# Patient Record
Sex: FEMALE | Race: WHITE | NOT HISPANIC OR LATINO | Employment: FULL TIME | URBAN - METROPOLITAN AREA
[De-identification: names, ages, dates, MRNs, and addresses within clinical notes are randomized per-mention and may not be internally consistent; named-entity substitution may affect disease eponyms.]

---

## 2023-04-21 PROBLEM — M77.9 TENDONITIS: Status: ACTIVE | Noted: 2023-04-21

## 2023-04-21 PROBLEM — K21.9 GERD (GASTROESOPHAGEAL REFLUX DISEASE): Status: ACTIVE | Noted: 2023-04-21

## 2023-04-21 PROBLEM — E78.5 HYPERLIPIDEMIA: Status: ACTIVE | Noted: 2023-04-21

## 2023-04-21 PROBLEM — T78.40XA ALLERGIES: Status: ACTIVE | Noted: 2023-04-21

## 2023-04-21 PROBLEM — I10 HYPERTENSION: Status: ACTIVE | Noted: 2023-04-21

## 2023-04-21 PROBLEM — F98.8 ATTENTION DEFICIT DISORDER (ADD) IN ADULT: Status: ACTIVE | Noted: 2023-04-21

## 2023-04-21 PROBLEM — Z87.898 HISTORY OF PREDIABETES: Status: ACTIVE | Noted: 2023-04-21

## 2023-05-27 DIAGNOSIS — K21.9 GASTROESOPHAGEAL REFLUX DISEASE WITHOUT ESOPHAGITIS: Primary | ICD-10-CM

## 2023-05-27 DIAGNOSIS — F98.8 ATTENTION DEFICIT DISORDER (ADD) IN ADULT: ICD-10-CM

## 2023-05-27 DIAGNOSIS — T78.40XA ALLERGY, INITIAL ENCOUNTER: ICD-10-CM

## 2023-05-31 RX ORDER — OMEPRAZOLE 20 MG/1
CAPSULE, DELAYED RELEASE ORAL
Qty: 90 CAPSULE | Refills: 1 | Status: SHIPPED | OUTPATIENT
Start: 2023-05-31 | End: 2023-10-11

## 2023-05-31 RX ORDER — MONTELUKAST SODIUM 10 MG/1
TABLET ORAL
Qty: 90 TABLET | Refills: 1 | Status: SHIPPED | OUTPATIENT
Start: 2023-05-31 | End: 2023-11-15 | Stop reason: SDUPTHER

## 2023-06-15 DIAGNOSIS — I10 PRIMARY HYPERTENSION: Primary | ICD-10-CM

## 2023-06-15 RX ORDER — LISINOPRIL AND HYDROCHLOROTHIAZIDE 12.5; 2 MG/1; MG/1
TABLET ORAL
Qty: 180 TABLET | Refills: 1 | Status: SHIPPED | OUTPATIENT
Start: 2023-06-15 | End: 2023-11-15 | Stop reason: SDUPTHER

## 2023-09-20 DIAGNOSIS — Z13.220 SCREENING, LIPID: ICD-10-CM

## 2023-09-20 DIAGNOSIS — Z13.0 SCREENING FOR ENDOCRINE, METABOLIC AND IMMUNITY DISORDER: Primary | ICD-10-CM

## 2023-09-20 DIAGNOSIS — Z13.29 SCREENING FOR ENDOCRINE, METABOLIC AND IMMUNITY DISORDER: Primary | ICD-10-CM

## 2023-09-20 DIAGNOSIS — Z11.59 ENCOUNTER FOR HEPATITIS C SCREENING TEST FOR LOW RISK PATIENT: ICD-10-CM

## 2023-09-20 DIAGNOSIS — E78.2 MIXED HYPERLIPIDEMIA: ICD-10-CM

## 2023-09-20 DIAGNOSIS — Z13.228 SCREENING FOR ENDOCRINE, METABOLIC AND IMMUNITY DISORDER: Primary | ICD-10-CM

## 2023-09-20 DIAGNOSIS — R39.15 URINARY URGENCY: ICD-10-CM

## 2023-09-20 RX ORDER — TOLTERODINE 4 MG/1
4 CAPSULE, EXTENDED RELEASE ORAL DAILY
Qty: 90 CAPSULE | Refills: 0 | Status: SHIPPED | OUTPATIENT
Start: 2023-09-20 | End: 2023-11-15 | Stop reason: SDUPTHER

## 2023-09-20 RX ORDER — ATORVASTATIN CALCIUM 10 MG/1
10 TABLET, FILM COATED ORAL NIGHTLY
Qty: 90 TABLET | Refills: 0 | Status: SHIPPED | OUTPATIENT
Start: 2023-09-20 | End: 2023-11-15 | Stop reason: SDUPTHER

## 2023-10-10 DIAGNOSIS — K21.9 GASTROESOPHAGEAL REFLUX DISEASE WITHOUT ESOPHAGITIS: ICD-10-CM

## 2023-10-11 RX ORDER — OMEPRAZOLE 20 MG/1
20 CAPSULE, DELAYED RELEASE ORAL DAILY
Qty: 90 CAPSULE | Refills: 1 | Status: SHIPPED | OUTPATIENT
Start: 2023-10-11 | End: 2023-11-15 | Stop reason: SDUPTHER

## 2023-10-13 ENCOUNTER — TELEPHONE (OUTPATIENT)
Dept: PHARMACY | Facility: HOSPITAL | Age: 48
End: 2023-10-13
Payer: MEDICARE

## 2023-10-13 NOTE — TELEPHONE ENCOUNTER
Population Health: Outreach by Ambulatory Pharmacy Team    Payor: Carmen HENSLEY  Reason: Adherence  Medication: Lisinopril & Hydrochlorothiazide Tab 20-12.5 MG  Outcome: Patient Reached: Claims Adherence, recently picked up fill a few days ago    Mary Andrade, PharmD  PGY-1 Pharmacy Resident  [phone]

## 2023-10-18 NOTE — TELEPHONE ENCOUNTER
I reviewed the progress note and agree with the resident’s findings and plans as written. Case discussed with resident.    Adriano Galan, PharmD

## 2023-11-15 ENCOUNTER — OFFICE VISIT (OUTPATIENT)
Dept: PRIMARY CARE | Facility: CLINIC | Age: 48
End: 2023-11-15
Payer: MEDICARE

## 2023-11-15 VITALS
SYSTOLIC BLOOD PRESSURE: 142 MMHG | HEART RATE: 87 BPM | HEIGHT: 60 IN | DIASTOLIC BLOOD PRESSURE: 82 MMHG | BODY MASS INDEX: 44.17 KG/M2 | OXYGEN SATURATION: 98 % | RESPIRATION RATE: 16 BRPM | WEIGHT: 225 LBS

## 2023-11-15 DIAGNOSIS — Z00.00 MEDICARE ANNUAL WELLNESS VISIT, SUBSEQUENT: Primary | ICD-10-CM

## 2023-11-15 DIAGNOSIS — N92.0 MENORRHAGIA WITH REGULAR CYCLE: ICD-10-CM

## 2023-11-15 DIAGNOSIS — Z12.11 ENCOUNTER FOR SCREENING FOR MALIGNANT NEOPLASM OF COLON: ICD-10-CM

## 2023-11-15 DIAGNOSIS — Z30.09 ENCOUNTER FOR COUNSELING REGARDING CONTRACEPTION: ICD-10-CM

## 2023-11-15 DIAGNOSIS — Z12.31 ENCOUNTER FOR SCREENING MAMMOGRAM FOR BREAST CANCER: ICD-10-CM

## 2023-11-15 DIAGNOSIS — T78.40XA ALLERGY, INITIAL ENCOUNTER: ICD-10-CM

## 2023-11-15 DIAGNOSIS — E66.01 OBESITY, MORBID (MULTI): ICD-10-CM

## 2023-11-15 DIAGNOSIS — E78.2 MIXED HYPERLIPIDEMIA: ICD-10-CM

## 2023-11-15 DIAGNOSIS — K21.9 GASTROESOPHAGEAL REFLUX DISEASE WITHOUT ESOPHAGITIS: ICD-10-CM

## 2023-11-15 DIAGNOSIS — R39.15 URINARY URGENCY: ICD-10-CM

## 2023-11-15 DIAGNOSIS — I10 PRIMARY HYPERTENSION: ICD-10-CM

## 2023-11-15 DIAGNOSIS — M77.9 TENDONITIS: ICD-10-CM

## 2023-11-15 PROCEDURE — 99396 PREV VISIT EST AGE 40-64: CPT | Performed by: STUDENT IN AN ORGANIZED HEALTH CARE EDUCATION/TRAINING PROGRAM

## 2023-11-15 PROCEDURE — G0439 PPPS, SUBSEQ VISIT: HCPCS | Performed by: STUDENT IN AN ORGANIZED HEALTH CARE EDUCATION/TRAINING PROGRAM

## 2023-11-15 PROCEDURE — 3079F DIAST BP 80-89 MM HG: CPT | Performed by: STUDENT IN AN ORGANIZED HEALTH CARE EDUCATION/TRAINING PROGRAM

## 2023-11-15 PROCEDURE — 99214 OFFICE O/P EST MOD 30 MIN: CPT | Performed by: STUDENT IN AN ORGANIZED HEALTH CARE EDUCATION/TRAINING PROGRAM

## 2023-11-15 PROCEDURE — 1036F TOBACCO NON-USER: CPT | Performed by: STUDENT IN AN ORGANIZED HEALTH CARE EDUCATION/TRAINING PROGRAM

## 2023-11-15 PROCEDURE — 3077F SYST BP >= 140 MM HG: CPT | Performed by: STUDENT IN AN ORGANIZED HEALTH CARE EDUCATION/TRAINING PROGRAM

## 2023-11-15 RX ORDER — OMEPRAZOLE 20 MG/1
20 CAPSULE, DELAYED RELEASE ORAL DAILY
Qty: 90 CAPSULE | Refills: 1 | Status: SHIPPED | OUTPATIENT
Start: 2023-11-15 | End: 2024-05-28

## 2023-11-15 RX ORDER — LISINOPRIL AND HYDROCHLOROTHIAZIDE 12.5; 2 MG/1; MG/1
1 TABLET ORAL 2 TIMES DAILY
Qty: 180 TABLET | Refills: 1 | Status: SHIPPED | OUTPATIENT
Start: 2023-11-15 | End: 2024-01-25 | Stop reason: DRUGHIGH

## 2023-11-15 RX ORDER — LISINOPRIL 10 MG/1
10 TABLET ORAL DAILY
Qty: 90 TABLET | Refills: 0 | Status: SHIPPED | OUTPATIENT
Start: 2023-11-15 | End: 2024-01-25 | Stop reason: SDUPTHER

## 2023-11-15 RX ORDER — ATORVASTATIN CALCIUM 10 MG/1
10 TABLET, FILM COATED ORAL NIGHTLY
Qty: 90 TABLET | Refills: 1 | Status: SHIPPED | OUTPATIENT
Start: 2023-11-15

## 2023-11-15 RX ORDER — DROSPIRENONE AND ETHINYL ESTRADIOL 0.03MG-3MG
1 KIT ORAL DAILY
Qty: 28 TABLET | Refills: 12 | Status: SHIPPED | OUTPATIENT
Start: 2023-11-15 | End: 2024-03-11

## 2023-11-15 RX ORDER — MONTELUKAST SODIUM 10 MG/1
10 TABLET ORAL DAILY
Qty: 90 TABLET | Refills: 1 | Status: SHIPPED | OUTPATIENT
Start: 2023-11-15 | End: 2024-06-03

## 2023-11-15 RX ORDER — MELOXICAM 15 MG/1
15 TABLET ORAL DAILY PRN
Qty: 90 TABLET | Refills: 1 | Status: SHIPPED | OUTPATIENT
Start: 2023-11-15 | End: 2024-05-20

## 2023-11-15 RX ORDER — TOLTERODINE 4 MG/1
4 CAPSULE, EXTENDED RELEASE ORAL DAILY
Qty: 90 CAPSULE | Refills: 1 | Status: SHIPPED | OUTPATIENT
Start: 2023-11-15 | End: 2024-06-03

## 2023-11-15 ASSESSMENT — ENCOUNTER SYMPTOMS
CONFUSION: 0
OCCASIONAL FEELINGS OF UNSTEADINESS: 0
DYSPHORIC MOOD: 0
FATIGUE: 0
ABDOMINAL PAIN: 0
TROUBLE SWALLOWING: 0
UNEXPECTED WEIGHT CHANGE: 0
FACIAL ASYMMETRY: 0
APPETITE CHANGE: 0
VOICE CHANGE: 0
PALPITATIONS: 0
DEPRESSION: 0
LOSS OF SENSATION IN FEET: 0
SPEECH DIFFICULTY: 0
ARTHRALGIAS: 0
ACTIVITY CHANGE: 0
FEVER: 0

## 2023-11-15 ASSESSMENT — ACTIVITIES OF DAILY LIVING (ADL)
DRESSING: INDEPENDENT
TAKING_MEDICATION: INDEPENDENT
BATHING: INDEPENDENT
MANAGING_FINANCES: NEEDS ASSISTANCE
DOING_HOUSEWORK: INDEPENDENT
GROCERY_SHOPPING: INDEPENDENT

## 2023-11-15 ASSESSMENT — PATIENT HEALTH QUESTIONNAIRE - PHQ9
2. FEELING DOWN, DEPRESSED OR HOPELESS: NOT AT ALL
1. LITTLE INTEREST OR PLEASURE IN DOING THINGS: NOT AT ALL
SUM OF ALL RESPONSES TO PHQ9 QUESTIONS 1 AND 2: 0

## 2023-11-15 NOTE — PROGRESS NOTES
"Subjective   Reason for Visit: Karyna Stone is an 48 y.o. female here for a Medicare Wellness visit.     Past Medical, Surgical, and Family History reviewed and updated in chart.    Reviewed all medications by prescribing practitioner or clinical pharmacist (such as prescriptions, OTCs, herbal therapies and supplements) and documented in the medical record.    HPI    49 yo female presents for medicare wellness  Insurance about to change, needs scripts printed    Doing well, about to move, no concerns     Patient Care Team:  Khushbu Contreras DO as PCP - General (Family Medicine)  Fco Grimm MD as PCP - [employer] PCP     Review of Systems   Constitutional:  Negative for activity change, appetite change, fatigue, fever and unexpected weight change.   HENT:  Negative for trouble swallowing and voice change.    Eyes:  Negative for visual disturbance.   Cardiovascular:  Negative for chest pain, palpitations and leg swelling.   Gastrointestinal:  Negative for abdominal pain.   Musculoskeletal:  Negative for arthralgias and gait problem.   Skin:  Negative for rash.   Allergic/Immunologic: Negative for immunocompromised state.   Neurological:  Negative for facial asymmetry and speech difficulty.   Psychiatric/Behavioral:  Negative for confusion and dysphoric mood.        Objective   Vitals:  /82   Pulse 87   Resp 16   Ht 1.53 m (5' 0.25\")   Wt 102 kg (225 lb)   SpO2 98%   BMI 43.58 kg/m²       Physical Exam  Constitutional:       Appearance: Normal appearance.   HENT:      Head: Normocephalic and atraumatic.   Eyes:      Extraocular Movements: Extraocular movements intact.   Cardiovascular:      Rate and Rhythm: Normal rate and regular rhythm.   Pulmonary:      Effort: Pulmonary effort is normal. No respiratory distress.   Musculoskeletal:      Right lower leg: No edema.      Left lower leg: No edema.   Skin:     Coloration: Skin is not jaundiced or pale.   Neurological:      " General: No focal deficit present.      Mental Status: She is alert and oriented to person, place, and time.   Psychiatric:         Mood and Affect: Mood normal.         Behavior: Behavior normal.         Thought Content: Thought content normal.         Judgment: Judgment normal.         Assessment/Plan   Problem List Items Addressed This Visit       Allergies    Relevant Medications    montelukast (Singulair) 10 mg tablet    GERD (gastroesophageal reflux disease)    Relevant Medications    omeprazole (PriLOSEC) 20 mg DR capsule    Hyperlipidemia    Relevant Medications    atorvastatin (Lipitor) 10 mg tablet    Hypertension (Chronic)    Current Assessment & Plan     Goal BP >130/80, suboptimal control  Continue lisinoptil hydrochlorothiazide 20-12.5, will add 10mg lisinopril   Work on maintaining a healthy weight, monitoring sodium intake, consistent activity and exercise  Avoid chronic use of NSAIDs  Do not use sudafed for decongestant when ill           Relevant Medications    lisinopriL-hydrochlorothiazide 20-12.5 mg tablet    lisinopril 10 mg tablet    Other Relevant Orders    Follow Up In [facility] - PCP - Established    Tendonitis    Relevant Medications    meloxicam (Mobic) 15 mg tablet    Medicare annual wellness visit, subsequent - Primary    Current Assessment & Plan     PNEUMONIA vaccine- Not Indicated  SHINGLES vaccine- Not Indicated  INFLUENZA vaccine- /Declined  Screening tests:  Colon cancer screening--> Ordered  Breast Cancer screening--> Ordered  Cervical Cancer Screening-->Due 2024   DXA screening--> Not Indicated  During the course of the visit the patient was educated and counseled about age appropriate screening and preventive services. Completed preventive screenings were documented in the chart and orders were placed for outstanding screenings/procedures as documented in the Assessment and Plan.  Patient Instructions (the written plan) was given to the patient at check out.            Menorrhagia with regular cycle (Chronic)    Current Assessment & Plan     Does well with Janice, periods well regulated  No breakthrough bleeding, hx of clots, able to take regularly with no missed doses          Obesity, morbid (CMS/HCC)    Current Assessment & Plan     Rides bike 3-4 times weekly.   Avoid excess fast food          Other Visit Diagnoses       Urinary urgency        Relevant Medications    tolterodine LA (Detrol LA) 4 mg 24 hr capsule    Encounter for counseling regarding contraception        Relevant Medications    drospirenone-ethinyl estradioL (Erika, Ocella) 3-0.03 mg tablet    Encounter for screening mammogram for breast cancer        Relevant Orders    BI mammo bilateral screening tomosynthesis    Encounter for screening for malignant neoplasm of colon        Relevant Orders    Cologuard® colon cancer screening

## 2023-11-15 NOTE — ASSESSMENT & PLAN NOTE
PNEUMONIA vaccine- Not Indicated  SHINGLES vaccine- Not Indicated  INFLUENZA vaccine- /Declined  Screening tests:  Colon cancer screening--> Ordered  Breast Cancer screening--> Ordered  Cervical Cancer Screening-->Due 2024   DXA screening--> Not Indicated  During the course of the visit the patient was educated and counseled about age appropriate screening and preventive services. Completed preventive screenings were documented in the chart and orders were placed for outstanding screenings/procedures as documented in the Assessment and Plan.  Patient Instructions (the written plan) was given to the patient at check out.

## 2023-11-15 NOTE — ASSESSMENT & PLAN NOTE
Goal BP >130/80, suboptimal control  Continue lisinoptil hydrochlorothiazide 20-12.5, will add 10mg lisinopril   Work on maintaining a healthy weight, monitoring sodium intake, consistent activity and exercise  Avoid chronic use of NSAIDs  Do not use sudafed for decongestant when ill

## 2023-11-15 NOTE — PATIENT INSTRUCTIONS
Adding lisinopril 10mg to lisinopril hydrochlorothiazide 20-12.5   PROVIDER:[TOKEN:[[id number]]],PROVIDER:[TOKEN:[[id number]]] Head atraumatic, normal cephalic shape.

## 2023-11-15 NOTE — ASSESSMENT & PLAN NOTE
Does well with Janice, periods well regulated  No breakthrough bleeding, hx of clots, able to take regularly with no missed doses

## 2024-01-25 ENCOUNTER — OFFICE VISIT (OUTPATIENT)
Dept: PRIMARY CARE | Facility: CLINIC | Age: 49
End: 2024-01-25
Payer: MEDICARE

## 2024-01-25 VITALS
RESPIRATION RATE: 16 BRPM | DIASTOLIC BLOOD PRESSURE: 92 MMHG | WEIGHT: 223 LBS | BODY MASS INDEX: 43.78 KG/M2 | HEIGHT: 60 IN | SYSTOLIC BLOOD PRESSURE: 140 MMHG | HEART RATE: 82 BPM | OXYGEN SATURATION: 100 %

## 2024-01-25 DIAGNOSIS — I10 PRIMARY HYPERTENSION: Primary | ICD-10-CM

## 2024-01-25 DIAGNOSIS — E66.01 OBESITY, MORBID (MULTI): ICD-10-CM

## 2024-01-25 PROCEDURE — 1036F TOBACCO NON-USER: CPT | Performed by: STUDENT IN AN ORGANIZED HEALTH CARE EDUCATION/TRAINING PROGRAM

## 2024-01-25 PROCEDURE — 99214 OFFICE O/P EST MOD 30 MIN: CPT | Performed by: STUDENT IN AN ORGANIZED HEALTH CARE EDUCATION/TRAINING PROGRAM

## 2024-01-25 PROCEDURE — 3080F DIAST BP >= 90 MM HG: CPT | Performed by: STUDENT IN AN ORGANIZED HEALTH CARE EDUCATION/TRAINING PROGRAM

## 2024-01-25 PROCEDURE — 3077F SYST BP >= 140 MM HG: CPT | Performed by: STUDENT IN AN ORGANIZED HEALTH CARE EDUCATION/TRAINING PROGRAM

## 2024-01-25 RX ORDER — LISINOPRIL 10 MG/1
10 TABLET ORAL DAILY
Qty: 90 TABLET | Refills: 0 | Status: SHIPPED | OUTPATIENT
Start: 2024-01-25 | End: 2024-07-23

## 2024-01-25 RX ORDER — LISINOPRIL AND HYDROCHLOROTHIAZIDE 12.5; 2 MG/1; MG/1
1 TABLET ORAL DAILY
Qty: 180 TABLET | Refills: 1 | Status: SHIPPED | OUTPATIENT
Start: 2024-01-25

## 2024-01-25 ASSESSMENT — ENCOUNTER SYMPTOMS
DIZZINESS: 0
OCCASIONAL FEELINGS OF UNSTEADINESS: 0
SHORTNESS OF BREATH: 0
LOSS OF SENSATION IN FEET: 0
CHEST TIGHTNESS: 0
DEPRESSION: 0
PALPITATIONS: 0
ACTIVITY CHANGE: 0
APPETITE CHANGE: 0
NERVOUS/ANXIOUS: 0
FATIGUE: 0
HEADACHES: 0

## 2024-01-25 ASSESSMENT — PATIENT HEALTH QUESTIONNAIRE - PHQ9
2. FEELING DOWN, DEPRESSED OR HOPELESS: NOT AT ALL
SUM OF ALL RESPONSES TO PHQ9 QUESTIONS 1 AND 2: 0
1. LITTLE INTEREST OR PLEASURE IN DOING THINGS: NOT AT ALL

## 2024-01-25 NOTE — ASSESSMENT & PLAN NOTE
Continue poor control, but mix up about medications  Lisinopril hydrochlorothiazide 20-12.5 PLUS lisinopril 10mg  Goal BP less than 130/80  Work on maintaining a healthy weight, monitoring sodium intake, consistent activity and exercise  Avoid chronic use of NSAIDs  Do not use sudafed for decongestant when ill

## 2024-01-25 NOTE — PROGRESS NOTES
"Patient Name:  Karyna Stone  MRN:  [id number]  :  1975    Subjective   Patient ID: Karyna Stone is a 48 y.o. female who presents for Blood Pressure Check.    HPI     47 yo female presents for BP follow up    LV added 10mg lisinopril to her usual regimen of lisinopril hydrochlorothiazide 20-12.5     /82 and 140/92    She reports some confusion and did not  the new lisinopril so has been continuing previous medication only     Review of Systems   Constitutional:  Negative for activity change, appetite change and fatigue.   Eyes:  Negative for visual disturbance.   Respiratory:  Negative for chest tightness and shortness of breath.    Cardiovascular:  Negative for chest pain, palpitations and leg swelling.   Neurological:  Negative for dizziness and headaches.   Psychiatric/Behavioral:  The patient is not nervous/anxious.      Objective   BP (!) 140/92   Pulse 82   Resp 16   Ht 1.53 m (5' 0.25\")   Wt 101 kg (223 lb)   SpO2 100%   BMI 43.19 kg/m²     Physical Exam  HENT:      Head: Normocephalic and atraumatic.   Eyes:      Extraocular Movements: Extraocular movements intact.   Cardiovascular:      Rate and Rhythm: Normal rate and regular rhythm.   Pulmonary:      Effort: Pulmonary effort is normal.   Skin:     Coloration: Skin is not jaundiced or pale.   Neurological:      General: No focal deficit present.      Mental Status: She is alert and oriented to person, place, and time.   Psychiatric:         Mood and Affect: Mood normal.         Behavior: Behavior normal.     Assessment/Plan   Problem List Items Addressed This Visit             ICD-10-CM    Hypertension - Primary (Chronic) I10     Continue poor control, but mix up about medications  Lisinopril hydrochlorothiazide 20-12.5 PLUS lisinopril 10mg  Goal BP less than 130/80  Work on maintaining a healthy weight, monitoring sodium intake, consistent activity and exercise  Avoid chronic use of NSAIDs  Do not use sudafed for decongestant " when ill           Relevant Medications    lisinopriL-hydrochlorothiazide 20-12.5 mg tablet    lisinopril 10 mg tablet    Other Relevant Orders    Follow Up In [facility] - PCP - Established    Obesity, morbid (CMS/HCC) E66.01     Rides bike and continues activity level

## 2024-03-10 DIAGNOSIS — Z30.09 ENCOUNTER FOR COUNSELING REGARDING CONTRACEPTION: ICD-10-CM

## 2024-03-11 RX ORDER — DROSPIRENONE AND ETHINYL ESTRADIOL 0.03MG-3MG
1 KIT ORAL DAILY
Qty: 84 TABLET | Refills: 3 | Status: SHIPPED | OUTPATIENT
Start: 2024-03-11

## 2024-04-24 ENCOUNTER — APPOINTMENT (OUTPATIENT)
Dept: PRIMARY CARE | Facility: CLINIC | Age: 49
End: 2024-04-24
Payer: MEDICARE

## 2024-05-18 DIAGNOSIS — M77.9 TENDONITIS: ICD-10-CM

## 2024-05-20 RX ORDER — MELOXICAM 15 MG/1
15 TABLET ORAL DAILY PRN
Qty: 90 TABLET | Refills: 1 | Status: SHIPPED | OUTPATIENT
Start: 2024-05-20

## 2024-05-27 DIAGNOSIS — K21.9 GASTROESOPHAGEAL REFLUX DISEASE WITHOUT ESOPHAGITIS: ICD-10-CM

## 2024-05-28 RX ORDER — OMEPRAZOLE 20 MG/1
CAPSULE, DELAYED RELEASE ORAL
Qty: 90 CAPSULE | Refills: 3 | Status: SHIPPED | OUTPATIENT
Start: 2024-05-28

## 2024-05-31 DIAGNOSIS — R39.15 URINARY URGENCY: ICD-10-CM

## 2024-06-02 DIAGNOSIS — T78.40XA ALLERGY, INITIAL ENCOUNTER: ICD-10-CM

## 2024-06-03 RX ORDER — TOLTERODINE 4 MG/1
CAPSULE, EXTENDED RELEASE ORAL
Qty: 90 CAPSULE | Refills: 1 | Status: SHIPPED | OUTPATIENT
Start: 2024-06-03

## 2024-06-03 RX ORDER — MONTELUKAST SODIUM 10 MG/1
10 TABLET ORAL DAILY
Qty: 90 TABLET | Refills: 1 | Status: SHIPPED | OUTPATIENT
Start: 2024-06-03

## 2024-06-17 ENCOUNTER — HOSPITAL ENCOUNTER (OUTPATIENT)
Dept: RADIOLOGY | Facility: HOSPITAL | Age: 49
Discharge: HOME | End: 2024-06-17
Payer: MEDICARE

## 2024-06-17 ENCOUNTER — TELEPHONE (OUTPATIENT)
Dept: PRIMARY CARE | Facility: CLINIC | Age: 49
End: 2024-06-17
Payer: MEDICARE

## 2024-06-17 DIAGNOSIS — Z12.31 ENCOUNTER FOR SCREENING MAMMOGRAM FOR BREAST CANCER: ICD-10-CM

## 2024-06-17 PROCEDURE — 77067 SCR MAMMO BI INCL CAD: CPT

## 2024-06-17 PROCEDURE — 77063 BREAST TOMOSYNTHESIS BI: CPT | Performed by: RADIOLOGY

## 2024-06-17 PROCEDURE — 77067 SCR MAMMO BI INCL CAD: CPT | Performed by: RADIOLOGY

## 2024-06-17 NOTE — TELEPHONE ENCOUNTER
----- Message from Khushbu Contreras DO sent at 6/17/2024  1:55 PM EDT -----  Review of mammogram, normal study no areas of concern will continue with annual screenings.

## 2024-06-22 DIAGNOSIS — E78.2 MIXED HYPERLIPIDEMIA: ICD-10-CM

## 2024-06-24 RX ORDER — ATORVASTATIN CALCIUM 10 MG/1
10 TABLET, FILM COATED ORAL NIGHTLY
Qty: 90 TABLET | Refills: 3 | Status: SHIPPED | OUTPATIENT
Start: 2024-06-24

## 2024-07-01 ENCOUNTER — APPOINTMENT (OUTPATIENT)
Dept: PRIMARY CARE | Facility: CLINIC | Age: 49
End: 2024-07-01
Payer: MEDICARE

## 2024-07-01 VITALS
SYSTOLIC BLOOD PRESSURE: 132 MMHG | DIASTOLIC BLOOD PRESSURE: 65 MMHG | OXYGEN SATURATION: 98 % | HEART RATE: 92 BPM | BODY MASS INDEX: 42.48 KG/M2 | WEIGHT: 225 LBS | HEIGHT: 61 IN

## 2024-07-01 DIAGNOSIS — I10 PRIMARY HYPERTENSION: ICD-10-CM

## 2024-07-01 DIAGNOSIS — R39.15 URINARY URGENCY: ICD-10-CM

## 2024-07-01 DIAGNOSIS — Z30.09 ENCOUNTER FOR COUNSELING REGARDING CONTRACEPTION: ICD-10-CM

## 2024-07-01 DIAGNOSIS — T78.40XA ALLERGY, INITIAL ENCOUNTER: ICD-10-CM

## 2024-07-01 DIAGNOSIS — M77.9 TENDONITIS: ICD-10-CM

## 2024-07-01 DIAGNOSIS — E78.2 MIXED HYPERLIPIDEMIA: ICD-10-CM

## 2024-07-01 DIAGNOSIS — E66.01 OBESITY, MORBID (MULTI): Primary | ICD-10-CM

## 2024-07-01 DIAGNOSIS — K21.9 GASTROESOPHAGEAL REFLUX DISEASE WITHOUT ESOPHAGITIS: ICD-10-CM

## 2024-07-01 DIAGNOSIS — N92.0 MENORRHAGIA WITH REGULAR CYCLE: Chronic | ICD-10-CM

## 2024-07-01 PROCEDURE — 1036F TOBACCO NON-USER: CPT | Performed by: STUDENT IN AN ORGANIZED HEALTH CARE EDUCATION/TRAINING PROGRAM

## 2024-07-01 PROCEDURE — 3075F SYST BP GE 130 - 139MM HG: CPT | Performed by: STUDENT IN AN ORGANIZED HEALTH CARE EDUCATION/TRAINING PROGRAM

## 2024-07-01 PROCEDURE — 99214 OFFICE O/P EST MOD 30 MIN: CPT | Performed by: STUDENT IN AN ORGANIZED HEALTH CARE EDUCATION/TRAINING PROGRAM

## 2024-07-01 PROCEDURE — 3078F DIAST BP <80 MM HG: CPT | Performed by: STUDENT IN AN ORGANIZED HEALTH CARE EDUCATION/TRAINING PROGRAM

## 2024-07-01 RX ORDER — MONTELUKAST SODIUM 10 MG/1
10 TABLET ORAL DAILY
Qty: 90 TABLET | Refills: 3 | Status: SHIPPED | OUTPATIENT
Start: 2024-07-01

## 2024-07-01 RX ORDER — MELOXICAM 15 MG/1
15 TABLET ORAL DAILY PRN
Qty: 90 TABLET | Refills: 1 | Status: SHIPPED | OUTPATIENT
Start: 2024-07-01 | End: 2024-07-01

## 2024-07-01 RX ORDER — LISINOPRIL 10 MG/1
10 TABLET ORAL DAILY
Qty: 90 TABLET | Refills: 3 | Status: CANCELLED | OUTPATIENT
Start: 2024-07-01 | End: 2025-06-26

## 2024-07-01 RX ORDER — ATORVASTATIN CALCIUM 10 MG/1
10 TABLET, FILM COATED ORAL NIGHTLY
Qty: 90 TABLET | Refills: 3 | Status: SHIPPED | OUTPATIENT
Start: 2024-07-01

## 2024-07-01 RX ORDER — DROSPIRENONE AND ETHINYL ESTRADIOL 0.03MG-3MG
1 KIT ORAL DAILY
Qty: 84 TABLET | Refills: 3 | Status: SHIPPED | OUTPATIENT
Start: 2024-07-01

## 2024-07-01 RX ORDER — LISINOPRIL AND HYDROCHLOROTHIAZIDE 12.5; 2 MG/1; MG/1
1 TABLET ORAL DAILY
Qty: 90 TABLET | Refills: 3 | Status: SHIPPED | OUTPATIENT
Start: 2024-07-01

## 2024-07-01 RX ORDER — OMEPRAZOLE 20 MG/1
CAPSULE, DELAYED RELEASE ORAL
Qty: 90 CAPSULE | Refills: 3 | Status: SHIPPED | OUTPATIENT
Start: 2024-07-01

## 2024-07-01 RX ORDER — DROSPIRENONE AND ETHINYL ESTRADIOL 0.03MG-3MG
1 KIT ORAL DAILY
Qty: 84 TABLET | Refills: 3 | Status: SHIPPED | OUTPATIENT
Start: 2024-07-01 | End: 2024-07-01

## 2024-07-01 RX ORDER — TOLTERODINE 4 MG/1
CAPSULE, EXTENDED RELEASE ORAL
Qty: 90 CAPSULE | Refills: 3 | Status: SHIPPED | OUTPATIENT
Start: 2024-07-01

## 2024-07-01 RX ORDER — MELOXICAM 15 MG/1
15 TABLET ORAL DAILY PRN
Qty: 90 TABLET | Refills: 1 | Status: SHIPPED | OUTPATIENT
Start: 2024-07-01

## 2024-07-01 ASSESSMENT — ENCOUNTER SYMPTOMS
LOSS OF SENSATION IN FEET: 0
SHORTNESS OF BREATH: 0
APPETITE CHANGE: 0
ACTIVITY CHANGE: 0
VOICE CHANGE: 0
SLEEP DISTURBANCE: 0
DYSPHORIC MOOD: 0
OCCASIONAL FEELINGS OF UNSTEADINESS: 0
TROUBLE SWALLOWING: 0
CONFUSION: 0
DEPRESSION: 0

## 2024-07-01 ASSESSMENT — COLUMBIA-SUICIDE SEVERITY RATING SCALE - C-SSRS
1. IN THE PAST MONTH, HAVE YOU WISHED YOU WERE DEAD OR WISHED YOU COULD GO TO SLEEP AND NOT WAKE UP?: NO
6. HAVE YOU EVER DONE ANYTHING, STARTED TO DO ANYTHING, OR PREPARED TO DO ANYTHING TO END YOUR LIFE?: NO
2. HAVE YOU ACTUALLY HAD ANY THOUGHTS OF KILLING YOURSELF?: NO

## 2024-07-01 ASSESSMENT — PATIENT HEALTH QUESTIONNAIRE - PHQ9
SUM OF ALL RESPONSES TO PHQ9 QUESTIONS 1 AND 2: 0
2. FEELING DOWN, DEPRESSED OR HOPELESS: NOT AT ALL
1. LITTLE INTEREST OR PLEASURE IN DOING THINGS: NOT AT ALL

## 2024-07-01 NOTE — PROGRESS NOTES
"Patient Name:  Karyna Stone  MRN:  [id number]  :  1975    Subjective   Patient ID: Karyna Stone is a 48 y.o. female who presents for Follow-up (Pt here to follow up).    HPI     49 yo female presents for follow and refills   Has not completed labs, reviewed today     Stopped 2nd lisinopril and now taking vitamins   She states BP has stayed well controlled    Review of Systems   Constitutional:  Negative for activity change and appetite change.   HENT:  Negative for trouble swallowing and voice change.    Respiratory:  Negative for shortness of breath.    Cardiovascular:  Negative for chest pain and leg swelling.   Allergic/Immunologic: Negative for immunocompromised state.   Psychiatric/Behavioral:  Negative for confusion, dysphoric mood and sleep disturbance.    Objective   /65 (BP Location: Right arm, Patient Position: Sitting)   Pulse 92   Ht 1.549 m (5' 1\")   Wt 102 kg (225 lb)   SpO2 98%   BMI 42.51 kg/m²     Physical Exam  Constitutional:       Appearance: Normal appearance.   HENT:      Head: Normocephalic.   Cardiovascular:      Rate and Rhythm: Normal rate and regular rhythm.   Pulmonary:      Effort: Pulmonary effort is normal. No respiratory distress.      Breath sounds: No wheezing.   Musculoskeletal:      Right lower leg: No edema.      Left lower leg: No edema.   Skin:     Coloration: Skin is not jaundiced or pale.   Neurological:      General: No focal deficit present.      Mental Status: She is alert and oriented to person, place, and time.   Psychiatric:         Mood and Affect: Mood normal.         Behavior: Behavior normal.     Assessment/Plan   Problem List Items Addressed This Visit             ICD-10-CM    Allergies (Chronic) T78.40XA     Avoidance of triggers  Continue montelukast, well controlled when taking   Refilled          Relevant Medications    montelukast (Singulair) 10 mg tablet    GERD (gastroesophageal reflux disease) (Chronic) K21.9     Medication and length " of use: omeprazole  Lifestyle modifications discussed with patient including:   Decreasing exacerbating foods including citric, acidic, spicy foods  Elevated head of the bed structurally or with pillows  Not eating 2 hours before laying down           Relevant Medications    omeprazole (PriLOSEC) 20 mg DR capsule    Hyperlipidemia E78.5    Relevant Medications    atorvastatin (Lipitor) 10 mg tablet    Hypertension (Chronic) I10     Goal BP less than 130/80  Continue off lisinopril 10mg, continue combination lisinopril with detrol  Work on maintaining a healthy weight, monitoring sodium intake, consistent activity and exercise  Avoid chronic use of NSAIDs  Do not use sudafed/pseudoephedrine for decongestant when ill           Relevant Medications    lisinopriL-hydrochlorothiazide 20-12.5 mg tablet    Other Relevant Orders    Follow Up In [facility] - PCP - Established    Tendonitis M77.9    Relevant Medications    meloxicam (Mobic) 15 mg tablet    Menorrhagia with regular cycle (Chronic) N92.0     Does well with Janice, periods well regulated  No breakthrough bleeding, hx of clots, able to take regularly with no missed doses             Obesity, morbid (Multi) - Primary E66.01     Bikes regularly  Work on  diet choices           Other Visit Diagnoses         Codes    Urinary urgency     R39.15    Relevant Medications    tolterodine LA (Detrol LA) 4 mg 24 hr capsule    Encounter for counseling regarding contraception     Z30.09    Relevant Medications    drospirenone-ethinyl estradioL (Erika, Ocella) 3-0.03 mg tablet

## 2024-07-01 NOTE — ASSESSMENT & PLAN NOTE
Medication and length of use: omeprazole  Lifestyle modifications discussed with patient including:   Decreasing exacerbating foods including citric, acidic, spicy foods  Elevated head of the bed structurally or with pillows  Not eating 2 hours before laying down

## 2024-07-01 NOTE — ASSESSMENT & PLAN NOTE
Goal BP less than 130/80  Continue off lisinopril 10mg, continue combination lisinopril   Work on maintaining a healthy weight, monitoring sodium intake, consistent activity and exercise  Avoid chronic use of NSAIDs  Do not use sudafed/pseudoephedrine for decongestant when ill

## 2024-08-30 ENCOUNTER — LAB (OUTPATIENT)
Dept: LAB | Facility: LAB | Age: 49
End: 2024-08-30
Payer: MEDICARE

## 2024-08-30 DIAGNOSIS — Z13.220 SCREENING, LIPID: ICD-10-CM

## 2024-08-30 DIAGNOSIS — Z11.59 ENCOUNTER FOR HEPATITIS C SCREENING TEST FOR LOW RISK PATIENT: ICD-10-CM

## 2024-08-30 DIAGNOSIS — Z13.29 SCREENING FOR ENDOCRINE, METABOLIC AND IMMUNITY DISORDER: ICD-10-CM

## 2024-08-30 DIAGNOSIS — Z13.0 SCREENING FOR ENDOCRINE, METABOLIC AND IMMUNITY DISORDER: ICD-10-CM

## 2024-08-30 DIAGNOSIS — Z13.228 SCREENING FOR ENDOCRINE, METABOLIC AND IMMUNITY DISORDER: ICD-10-CM

## 2024-08-30 LAB
ALBUMIN SERPL BCP-MCNC: 4 G/DL (ref 3.4–5)
ALP SERPL-CCNC: 57 U/L (ref 33–110)
ALT SERPL W P-5'-P-CCNC: 13 U/L (ref 7–45)
ANION GAP SERPL CALC-SCNC: 18 MMOL/L (ref 10–20)
AST SERPL W P-5'-P-CCNC: 14 U/L (ref 9–39)
BILIRUB SERPL-MCNC: 0.7 MG/DL (ref 0–1.2)
BUN SERPL-MCNC: 10 MG/DL (ref 6–23)
CALCIUM SERPL-MCNC: 8.7 MG/DL (ref 8.6–10.3)
CHLORIDE SERPL-SCNC: 98 MMOL/L (ref 98–107)
CHOLEST SERPL-MCNC: 215 MG/DL (ref 0–199)
CHOLESTEROL/HDL RATIO: 2.7
CO2 SERPL-SCNC: 21 MMOL/L (ref 21–32)
CREAT SERPL-MCNC: 0.53 MG/DL (ref 0.5–1.05)
EGFRCR SERPLBLD CKD-EPI 2021: >90 ML/MIN/1.73M*2
GLUCOSE SERPL-MCNC: 97 MG/DL (ref 74–99)
HDLC SERPL-MCNC: 78.8 MG/DL
LDLC SERPL CALC-MCNC: 117 MG/DL
NON HDL CHOLESTEROL: 136 MG/DL (ref 0–149)
POTASSIUM SERPL-SCNC: 4.1 MMOL/L (ref 3.5–5.3)
PROT SERPL-MCNC: 6.4 G/DL (ref 6.4–8.2)
SODIUM SERPL-SCNC: 133 MMOL/L (ref 136–145)
TRIGL SERPL-MCNC: 94 MG/DL (ref 0–149)
VLDL: 19 MG/DL (ref 0–40)

## 2024-08-30 PROCEDURE — 36415 COLL VENOUS BLD VENIPUNCTURE: CPT

## 2024-09-02 DIAGNOSIS — I10 PRIMARY HYPERTENSION: ICD-10-CM

## 2024-09-03 ENCOUNTER — TELEPHONE (OUTPATIENT)
Dept: PRIMARY CARE | Facility: CLINIC | Age: 49
End: 2024-09-03
Payer: MEDICARE

## 2024-09-03 RX ORDER — LISINOPRIL AND HYDROCHLOROTHIAZIDE 12.5; 2 MG/1; MG/1
1 TABLET ORAL 2 TIMES DAILY
Qty: 180 TABLET | Refills: 1 | Status: SHIPPED | OUTPATIENT
Start: 2024-09-03

## 2024-09-03 NOTE — TELEPHONE ENCOUNTER
"----- Message from Khushbu Contreras sent at 9/3/2024 12:15 PM EDT -----  Unfortunately her LDL worsened on these labs, this leads me to believe the 10mg of lipitor is not enough I would recommend we increase to 20mg, 2 tablet daily.    What else you can do to help prevent cholesterol:    -Limit foods high in saturated fat. Saturated fats come from animal products (such as cheese, fatty meats, and dairy desserts) and tropical oils (such as palm oil). Foods that are higher in saturated fat may be high in cholesterol.  -Choose foods that are low in saturated fat, trans fat, sodium (salt), and added sugars. These foods include lean meats; seafood; fat-free or low-fat milk, cheese, and yogurt; whole grains; and fruits and vegetables.  -Eat foods naturally high in fiber, such as oatmeal and beans (black, lindsay, kidney, lima, and others), and unsaturated fats, which can be found in avocados, vegetable oils like olive oil, and nuts. These foods may help prevent and manage high levels of low-density lipoprotein (LDL, or \"bad\") cholesterol and triglycerides while increasing high-density lipoprotein (HDL, or \"good\") cholesterol levels.    "

## 2024-09-03 NOTE — TELEPHONE ENCOUNTER
Pharmacist Admission Medication Reconciliation Pending Note    Prior to Admission Medications were reviewed by the pharmacist and pended for provider review during admission medication reconciliation.    Medications were pended by the pharmacist at this time as follows:    Pended Admission Order Reconciliation Actions - Heather Bryant, [facility] 3/14/2020  3:33 PM     Order Name Action Reordered As    EPINEPHrine (EPIPEN 2-GABE) 0.3 MG/0.3ML JOHN auto-injector Do Not Order for Admission     pantoprazole (PROTONIX) 40 MG tablet Do Not Order for Admission     losartan (COZAAR) 50 MG tablet Order for Admission losartan (COZAAR) tablet 100 mg    metoPROLOL succinate (TOPROL-XL) 50 MG 24 hr tablet Order for Admission metoPROLOL succinate (TOPROL-XL) ER tablet 50 mg    amLODIPine (NORVASC) 5 MG tablet Order for Admission amLODIPine (NORVASC) tablet 5 mg    clonazePAM (KLONOPIN) 1 MG tablet Order for Admission clonazePAM (KlonoPIN) tablet 1-2 mg    sildenafil (REVATIO) 20 MG tablet Do Not Order for Admission     chlorthalidone (HYGROTEN) 50 MG tablet Replace at Admission chlorthalidone (THALITONE) tablet 50 mg    potassium CHLORIDE (KLOR-CON M) 20 MEQ juan jose ER tablet Order for Admission potassium CHLORIDE (KLOR-CON M) juan jose ER tablet 20 mEq    ibuprofen (MOTRIN) 200 MG tablet Do Not Order for Admission             Orders Pended To Continue For Hospital Stay     ID Description Pended By When Reason    [id number] amLODIPine (NORVASC) tablet 5 mg-DAILY Heatherghislaine Roblero Manny, [facility] 03/14/20 1533     [id number] chlorthalidone (THALITONE) tablet 50 mg-DAILY Heather Annika Manny, [facility] 03/14/20 1533     [id number] clonazePAM (KlonoPIN) tablet 1-2 mg-DAILY PRN Heather Bryant, [facility] 03/14/20 1533     [id number] potassium CHLORIDE (KLOR-CON M) juan jose ER tablet 20 mEq-DAILY WITH BREAKFAST Heather Annikabel Bryant, [facility] 03/14/20 1533     [id number] metoPROLOL succinate (TOPROL-XL) ER tablet [facility]ILY [facility]     [id number] losartan (COZAAR)  pended   tablet 100 mg-NIGHTLY Heather Bryant, [facility] 03/14/20 1533             Pharmacist Notations:           Orders that are ultimately reconciled and signed during admission medication reconciliation may differ from the pended actions above.    Please contact the pharmacist for questions.    Heather Bryant, [facility]  3/14/2020 3:33 PM

## 2024-11-09 DIAGNOSIS — M77.9 TENDONITIS: ICD-10-CM

## 2024-11-11 RX ORDER — MELOXICAM 15 MG/1
15 TABLET ORAL DAILY PRN
Qty: 90 TABLET | Refills: 1 | Status: SHIPPED | OUTPATIENT
Start: 2024-11-11

## 2025-01-06 ENCOUNTER — APPOINTMENT (OUTPATIENT)
Dept: PRIMARY CARE | Facility: CLINIC | Age: 50
End: 2025-01-06
Payer: MEDICARE

## 2025-01-06 VITALS
SYSTOLIC BLOOD PRESSURE: 101 MMHG | BODY MASS INDEX: 46.44 KG/M2 | HEIGHT: 61 IN | DIASTOLIC BLOOD PRESSURE: 66 MMHG | HEART RATE: 92 BPM | OXYGEN SATURATION: 97 % | WEIGHT: 246 LBS

## 2025-01-06 DIAGNOSIS — Z00.00 MEDICARE ANNUAL WELLNESS VISIT, SUBSEQUENT: Primary | ICD-10-CM

## 2025-01-06 DIAGNOSIS — T78.40XA ALLERGY, INITIAL ENCOUNTER: ICD-10-CM

## 2025-01-06 DIAGNOSIS — R79.9 ABNORMAL FINDING OF BLOOD CHEMISTRY, UNSPECIFIED: ICD-10-CM

## 2025-01-06 DIAGNOSIS — E78.2 MIXED HYPERLIPIDEMIA: ICD-10-CM

## 2025-01-06 DIAGNOSIS — E55.9 VITAMIN D DEFICIENCY: ICD-10-CM

## 2025-01-06 DIAGNOSIS — I10 PRIMARY HYPERTENSION: ICD-10-CM

## 2025-01-06 DIAGNOSIS — Z12.11 ENCOUNTER FOR SCREENING FOR MALIGNANT NEOPLASM OF COLON: ICD-10-CM

## 2025-01-06 DIAGNOSIS — K21.9 GASTROESOPHAGEAL REFLUX DISEASE WITHOUT ESOPHAGITIS: ICD-10-CM

## 2025-01-06 DIAGNOSIS — Z30.09 ENCOUNTER FOR COUNSELING REGARDING CONTRACEPTION: ICD-10-CM

## 2025-01-06 DIAGNOSIS — Z87.898 HISTORY OF PREDIABETES: ICD-10-CM

## 2025-01-06 DIAGNOSIS — M77.9 TENDONITIS: ICD-10-CM

## 2025-01-06 DIAGNOSIS — R39.15 URINARY URGENCY: ICD-10-CM

## 2025-01-06 PROCEDURE — 3074F SYST BP LT 130 MM HG: CPT | Performed by: STUDENT IN AN ORGANIZED HEALTH CARE EDUCATION/TRAINING PROGRAM

## 2025-01-06 PROCEDURE — 3078F DIAST BP <80 MM HG: CPT | Performed by: STUDENT IN AN ORGANIZED HEALTH CARE EDUCATION/TRAINING PROGRAM

## 2025-01-06 PROCEDURE — 99214 OFFICE O/P EST MOD 30 MIN: CPT | Performed by: STUDENT IN AN ORGANIZED HEALTH CARE EDUCATION/TRAINING PROGRAM

## 2025-01-06 PROCEDURE — 1036F TOBACCO NON-USER: CPT | Performed by: STUDENT IN AN ORGANIZED HEALTH CARE EDUCATION/TRAINING PROGRAM

## 2025-01-06 PROCEDURE — 90715 TDAP VACCINE 7 YRS/> IM: CPT | Performed by: STUDENT IN AN ORGANIZED HEALTH CARE EDUCATION/TRAINING PROGRAM

## 2025-01-06 PROCEDURE — 90471 IMMUNIZATION ADMIN: CPT | Performed by: STUDENT IN AN ORGANIZED HEALTH CARE EDUCATION/TRAINING PROGRAM

## 2025-01-06 PROCEDURE — 3008F BODY MASS INDEX DOCD: CPT | Performed by: STUDENT IN AN ORGANIZED HEALTH CARE EDUCATION/TRAINING PROGRAM

## 2025-01-06 PROCEDURE — G0439 PPPS, SUBSEQ VISIT: HCPCS | Performed by: STUDENT IN AN ORGANIZED HEALTH CARE EDUCATION/TRAINING PROGRAM

## 2025-01-06 RX ORDER — OMEPRAZOLE 20 MG/1
CAPSULE, DELAYED RELEASE ORAL
Qty: 90 CAPSULE | Refills: 3 | Status: SHIPPED | OUTPATIENT
Start: 2025-01-06

## 2025-01-06 RX ORDER — DROSPIRENONE AND ETHINYL ESTRADIOL 0.03MG-3MG
1 KIT ORAL DAILY
Qty: 84 TABLET | Refills: 3 | Status: SHIPPED | OUTPATIENT
Start: 2025-01-06

## 2025-01-06 RX ORDER — MONTELUKAST SODIUM 10 MG/1
10 TABLET ORAL DAILY
Qty: 90 TABLET | Refills: 3 | Status: SHIPPED | OUTPATIENT
Start: 2025-01-06

## 2025-01-06 RX ORDER — ATORVASTATIN CALCIUM 10 MG/1
10 TABLET, FILM COATED ORAL NIGHTLY
Qty: 90 TABLET | Refills: 3 | Status: SHIPPED | OUTPATIENT
Start: 2025-01-06

## 2025-01-06 RX ORDER — LISINOPRIL 10 MG/1
10 TABLET ORAL DAILY
Qty: 90 TABLET | Refills: 0 | Status: SHIPPED | OUTPATIENT
Start: 2025-01-06 | End: 2025-07-05

## 2025-01-06 RX ORDER — MELOXICAM 15 MG/1
15 TABLET ORAL DAILY PRN
Qty: 90 TABLET | Refills: 1 | Status: SHIPPED | OUTPATIENT
Start: 2025-01-06

## 2025-01-06 RX ORDER — LISINOPRIL AND HYDROCHLOROTHIAZIDE 12.5; 2 MG/1; MG/1
1 TABLET ORAL 2 TIMES DAILY
Qty: 180 TABLET | Refills: 1 | Status: SHIPPED | OUTPATIENT
Start: 2025-01-06

## 2025-01-06 RX ORDER — TOLTERODINE 4 MG/1
CAPSULE, EXTENDED RELEASE ORAL
Qty: 90 CAPSULE | Refills: 3 | Status: SHIPPED | OUTPATIENT
Start: 2025-01-06

## 2025-01-06 ASSESSMENT — PATIENT HEALTH QUESTIONNAIRE - PHQ9
SUM OF ALL RESPONSES TO PHQ9 QUESTIONS 1 AND 2: 0
2. FEELING DOWN, DEPRESSED OR HOPELESS: NOT AT ALL
2. FEELING DOWN, DEPRESSED OR HOPELESS: NOT AT ALL
1. LITTLE INTEREST OR PLEASURE IN DOING THINGS: NOT AT ALL
1. LITTLE INTEREST OR PLEASURE IN DOING THINGS: NOT AT ALL
SUM OF ALL RESPONSES TO PHQ9 QUESTIONS 1 AND 2: 0

## 2025-01-06 ASSESSMENT — ENCOUNTER SYMPTOMS
HEADACHES: 0
SHORTNESS OF BREATH: 0
BACK PAIN: 0
OCCASIONAL FEELINGS OF UNSTEADINESS: 0
DEPRESSION: 0
LIGHT-HEADEDNESS: 0
FEVER: 0
FATIGUE: 0
LOSS OF SENSATION IN FEET: 0
ARTHRALGIAS: 0
WHEEZING: 0
CONFUSION: 0

## 2025-01-06 ASSESSMENT — ACTIVITIES OF DAILY LIVING (ADL)
TAKING_MEDICATION: INDEPENDENT
MANAGING_FINANCES: TOTAL CARE
BATHING: INDEPENDENT
DOING_HOUSEWORK: INDEPENDENT
DRESSING: INDEPENDENT
GROCERY_SHOPPING: INDEPENDENT

## 2025-01-06 ASSESSMENT — COLUMBIA-SUICIDE SEVERITY RATING SCALE - C-SSRS
1. IN THE PAST MONTH, HAVE YOU WISHED YOU WERE DEAD OR WISHED YOU COULD GO TO SLEEP AND NOT WAKE UP?: NO
2. HAVE YOU ACTUALLY HAD ANY THOUGHTS OF KILLING YOURSELF?: NO
6. HAVE YOU EVER DONE ANYTHING, STARTED TO DO ANYTHING, OR PREPARED TO DO ANYTHING TO END YOUR LIFE?: NO

## 2025-01-06 NOTE — ASSESSMENT & PLAN NOTE
Tdap-Ordered  Flu-Declines     Colon cancer screening- cologuard ordered  Mammogram - Due June 2025  Recommend cervical cancer screening     During the course of the visit the patient was educated and counseled about age appropriate screening and preventive services. Completed preventive screenings were documented in the chart and orders were placed for outstanding screenings/procedures as documented in the Assessment and Plan.    Patient Instructions (the written plan) was given to the patient at check out that include any community based lifestyle interventions.    Other risk factors and conditions for which interventions are recommended are addressed as the other tagged diagnoses in this encounter.

## 2025-01-06 NOTE — ASSESSMENT & PLAN NOTE
Orders:    omeprazole (PriLOSEC) 20 mg DR capsule; TAKE 1 CAPSULE BY MOUTH EVERY DAY BEFORE BREAKFAST

## 2025-01-06 NOTE — PROGRESS NOTES
Subjective   Reason for Visit: Karyna Stone is an 49 y.o. female here for a Medicare Wellness visit.     Past Medical, Surgical, and Family History reviewed and updated in chart.    Reviewed all medications by prescribing practitioner or clinical pharmacist (such as prescriptions, OTCs, herbal therapies and supplements) and documented in the medical record.    HPI    50 yo female presents for follow up and medicare wellness  No concerns today but needs refills, prefers print    Patient Care Team:  Khushbu Contreras DO as PCP - General (Family Medicine)  Khushbu Contreras DO as PCP - [employer] PCP     Past Medical History:   Diagnosis Date    Obesity, unspecified 04/12/2022    Class 2 obesity with body mass index (BMI) of 39.0 to 39.9 in adult    Pain in left leg 04/12/2022    Pain of left lower extremity    Pain in right arm 04/18/2021    Pain of right upper extremity    Personal history of other diseases of the musculoskeletal system and connective tissue 10/12/2021    History of tendinitis     Past Surgical History:   Procedure Laterality Date    OTHER SURGICAL HISTORY  11/18/2019    Tonsillectomy with adenoidectomy    OTHER SURGICAL HISTORY  11/18/2019    Cholecystectomy     Family History   Problem Relation Name Age of Onset    Lung cancer Father's Brother      Lung cancer Maternal Grandfather      Cancer Paternal Grandmother         Body mass index is 46.48 kg/m².    Tobacco/Alcohol/Opioid use, as well as Illicit Drug Use was screened for/reviewed and documented in Social History section and medication list as appropriate    Medications and Supplements  prescribed by me and other practitioners or clinical pharmacist (such as prescriptions, OTC's, herbal therapies and supplements) were reviewed and documented in the medical record.    Tobacco/Alcohol/Opioid use, as well as Illicit Drug Use was screened for/reviewed and documented in Social History section and medication list as  "appropriate    Review of Systems   Constitutional:  Negative for fatigue and fever.   Respiratory:  Negative for shortness of breath and wheezing.    Cardiovascular:  Negative for chest pain and leg swelling.   Musculoskeletal:  Negative for arthralgias and back pain.   Allergic/Immunologic: Negative for immunocompromised state.   Neurological:  Negative for light-headedness and headaches.   Psychiatric/Behavioral:  Negative for confusion.      Objective   Vitals:  /66   Pulse 92   Ht 1.549 m (5' 1\")   Wt 112 kg (246 lb)   SpO2 97%   BMI 46.48 kg/m²       Physical Exam  Constitutional:       Appearance: Normal appearance.   HENT:      Head: Normocephalic and atraumatic.   Cardiovascular:      Rate and Rhythm: Normal rate and regular rhythm.   Pulmonary:      Effort: Pulmonary effort is normal.   Musculoskeletal:      Right lower leg: No edema.      Left lower leg: No edema.   Skin:     Coloration: Skin is not jaundiced.   Neurological:      Mental Status: She is alert and oriented to person, place, and time.   Psychiatric:         Mood and Affect: Mood normal.         Behavior: Behavior normal.       Assessment & Plan  Medicare annual wellness visit, subsequent  Tdap-Ordered  Flu-Declines     Colon cancer screening- cologuard ordered  Mammogram - Due June 2025  Recommend cervical cancer screening     During the course of the visit the patient was educated and counseled about age appropriate screening and preventive services. Completed preventive screenings were documented in the chart and orders were placed for outstanding screenings/procedures as documented in the Assessment and Plan.    Patient Instructions (the written plan) was given to the patient at check out that include any community based lifestyle interventions.    Other risk factors and conditions for which interventions are recommended are addressed as the other tagged diagnoses in this encounter.          Primary hypertension  Goal BP less " than 130/80  Continue lisinopril + lisinopril hydrochlorothiazide   Work on maintaining a healthy weight, monitoring sodium intake, consistent activity and exercise  Avoid chronic use of NSAIDs  Do not use sudafed/pseudoephedrine for decongestant when ill    Orders:    Follow Up In [facility] - PCP - Established    lisinopril 10 mg tablet; Take 1 tablet (10 mg) by mouth once daily.    lisinopriL-hydrochlorothiazide 20-12.5 mg tablet; Take 1 tablet by mouth 2 times a day.    Follow Up In [facility] - PCP - Established; Future    Mixed hyperlipidemia  Orders:    atorvastatin (Lipitor) 10 mg tablet; Take 1 tablet (10 mg) by mouth once daily at bedtime.    Lipid Panel; Future    Encounter for counseling regarding contraception  Takes as prescribed without gaps or breaks  Review of safe sex practices  Orders:    drospirenone-ethinyl estradioL (Erika, Ocella) 3-0.03 mg tablet; Take 1 tablet by mouth once daily. as directed    Tendonitis  Orders:    meloxicam (Mobic) 15 mg tablet; Take 1 tablet (15 mg) by mouth once daily as needed for moderate pain (4 - 6).    Allergy, initial encounter  Orders:    montelukast (Singulair) 10 mg tablet; Take 1 tablet (10 mg) by mouth once daily.    Gastroesophageal reflux disease without esophagitis  Orders:    omeprazole (PriLOSEC) 20 mg DR capsule; TAKE 1 CAPSULE BY MOUTH EVERY DAY BEFORE BREAKFAST    Urinary urgency  Orders:    tolterodine LA (Detrol LA) 4 mg 24 hr capsule; TAKE 1 CAPSULE BY MOUTH ONCE DAILY. DO NOT CRUSH,CHEW, OR SPIT.    History of prediabetes  Orders:    Comprehensive Metabolic Panel; Future    CBC and Auto Differential; Future    Hemoglobin A1c; Future    Vitamin D deficiency  Orders:    Vitamin D 25-Hydroxy,Total (for eval of Vitamin D levels); Future    Abnormal finding of blood chemistry, unspecified  Orders:    Hemoglobin A1c; Future    Encounter for screening for malignant neoplasm of colon  Orders:    Cologuard® colon cancer screening;  Future    Cologuard® colon cancer screening

## 2025-01-06 NOTE — ASSESSMENT & PLAN NOTE
Orders:    atorvastatin (Lipitor) 10 mg tablet; Take 1 tablet (10 mg) by mouth once daily at bedtime.    Lipid Panel; Future

## 2025-01-06 NOTE — ASSESSMENT & PLAN NOTE
Goal BP less than 130/80  Continue lisinopril + lisinopril hydrochlorothiazide   Work on maintaining a healthy weight, monitoring sodium intake, consistent activity and exercise  Avoid chronic use of NSAIDs  Do not use sudafed/pseudoephedrine for decongestant when ill    Orders:    Follow Up In [facility] - PCP - Established    lisinopril 10 mg tablet; Take 1 tablet (10 mg) by mouth once daily.    lisinopriL-hydrochlorothiazide 20-12.5 mg tablet; Take 1 tablet by mouth 2 times a day.    Follow Up In [facility] - PCP - Established; Future

## 2025-01-06 NOTE — ASSESSMENT & PLAN NOTE
Orders:    Comprehensive Metabolic Panel; Future    CBC and Auto Differential; Future    Hemoglobin A1c; Future

## 2025-01-06 NOTE — ASSESSMENT & PLAN NOTE
Orders:    meloxicam (Mobic) 15 mg tablet; Take 1 tablet (15 mg) by mouth once daily as needed for moderate pain (4 - 6).

## 2025-02-05 ENCOUNTER — TELEPHONE (OUTPATIENT)
Dept: PRIMARY CARE | Facility: CLINIC | Age: 50
End: 2025-02-05
Payer: MEDICARE

## 2025-02-05 LAB — NONINV COLON CA DNA+OCC BLD SCRN STL QL: NEGATIVE

## 2025-02-05 NOTE — TELEPHONE ENCOUNTER
----- Message from Khushbu Contreras sent at 2/5/2025  7:11 AM EST -----  Normal cologuard, which is good. Plan to recheck in 3 years.

## 2025-02-09 DIAGNOSIS — Z30.09 ENCOUNTER FOR COUNSELING REGARDING CONTRACEPTION: ICD-10-CM

## 2025-02-10 RX ORDER — DROSPIRENONE AND ETHINYL ESTRADIOL 0.03MG-3MG
1 KIT ORAL DAILY
Qty: 84 TABLET | Refills: 3 | Status: SHIPPED | OUTPATIENT
Start: 2025-02-10

## 2025-03-11 ENCOUNTER — TELEPHONE (OUTPATIENT)
Dept: PRIMARY CARE | Facility: CLINIC | Age: 50
End: 2025-03-11
Payer: MEDICARE

## 2025-03-11 ENCOUNTER — HOSPITAL ENCOUNTER (EMERGENCY)
Facility: HOSPITAL | Age: 50
Discharge: HOME | End: 2025-03-11
Attending: STUDENT IN AN ORGANIZED HEALTH CARE EDUCATION/TRAINING PROGRAM
Payer: MEDICARE

## 2025-03-11 VITALS
DIASTOLIC BLOOD PRESSURE: 70 MMHG | HEART RATE: 88 BPM | TEMPERATURE: 97.5 F | OXYGEN SATURATION: 98 % | RESPIRATION RATE: 12 BRPM | SYSTOLIC BLOOD PRESSURE: 141 MMHG | BODY MASS INDEX: 43.43 KG/M2 | HEIGHT: 61 IN | WEIGHT: 230 LBS

## 2025-03-11 DIAGNOSIS — I10 HYPERTENSION, UNSPECIFIED TYPE: Primary | Chronic | ICD-10-CM

## 2025-03-11 DIAGNOSIS — T78.3XXA ANGIOEDEMA, INITIAL ENCOUNTER: Primary | ICD-10-CM

## 2025-03-11 LAB
ABO GROUP (TYPE) IN BLOOD: NORMAL
ABO GROUP (TYPE) IN BLOOD: NORMAL
ANTIBODY SCREEN: NORMAL
BLOOD EXPIRATION DATE: NORMAL
DISPENSE STATUS: NORMAL
PRODUCT BLOOD TYPE: 8400
PRODUCT CODE: NORMAL
RH FACTOR (ANTIGEN D): NORMAL
RH FACTOR (ANTIGEN D): NORMAL
UNIT ABO: NORMAL
UNIT NUMBER: NORMAL
UNIT RH: NORMAL
UNIT VOLUME: 325
UNIT VOLUME: 325
UNIT VOLUME: 327
UNIT VOLUME: 327

## 2025-03-11 PROCEDURE — 36430 TRANSFUSION BLD/BLD COMPNT: CPT

## 2025-03-11 PROCEDURE — 99284 EMERGENCY DEPT VISIT MOD MDM: CPT | Mod: 25 | Performed by: STUDENT IN AN ORGANIZED HEALTH CARE EDUCATION/TRAINING PROGRAM

## 2025-03-11 PROCEDURE — 96372 THER/PROPH/DIAG INJ SC/IM: CPT | Performed by: STUDENT IN AN ORGANIZED HEALTH CARE EDUCATION/TRAINING PROGRAM

## 2025-03-11 PROCEDURE — 36415 COLL VENOUS BLD VENIPUNCTURE: CPT | Performed by: STUDENT IN AN ORGANIZED HEALTH CARE EDUCATION/TRAINING PROGRAM

## 2025-03-11 PROCEDURE — 2500000004 HC RX 250 GENERAL PHARMACY W/ HCPCS (ALT 636 FOR OP/ED): Performed by: STUDENT IN AN ORGANIZED HEALTH CARE EDUCATION/TRAINING PROGRAM

## 2025-03-11 PROCEDURE — 96365 THER/PROPH/DIAG IV INF INIT: CPT | Mod: 59

## 2025-03-11 PROCEDURE — P9017 PLASMA 1 DONOR FRZ W/IN 8 HR: HCPCS

## 2025-03-11 PROCEDURE — 86900 BLOOD TYPING SEROLOGIC ABO: CPT | Performed by: STUDENT IN AN ORGANIZED HEALTH CARE EDUCATION/TRAINING PROGRAM

## 2025-03-11 PROCEDURE — 96375 TX/PRO/DX INJ NEW DRUG ADDON: CPT

## 2025-03-11 RX ORDER — TRANEXAMIC ACID 10 MG/ML
1000 INJECTION, SOLUTION INTRAVENOUS ONCE
Status: COMPLETED | OUTPATIENT
Start: 2025-03-11 | End: 2025-03-11

## 2025-03-11 RX ORDER — PREDNISONE 50 MG/1
50 TABLET ORAL DAILY
Qty: 5 TABLET | Refills: 0 | Status: SHIPPED | OUTPATIENT
Start: 2025-03-11 | End: 2025-03-16

## 2025-03-11 RX ORDER — DIPHENHYDRAMINE HYDROCHLORIDE 50 MG/ML
50 INJECTION INTRAMUSCULAR; INTRAVENOUS ONCE
Status: COMPLETED | OUTPATIENT
Start: 2025-03-11 | End: 2025-03-11

## 2025-03-11 RX ORDER — FAMOTIDINE 20 MG/1
20 TABLET, FILM COATED ORAL 2 TIMES DAILY
Qty: 30 TABLET | Refills: 0 | Status: SHIPPED | OUTPATIENT
Start: 2025-03-11 | End: 2025-03-26

## 2025-03-11 RX ORDER — EPINEPHRINE 1 MG/ML
0.5 INJECTION INTRAMUSCULAR; INTRAVENOUS; SUBCUTANEOUS ONCE
Status: COMPLETED | OUTPATIENT
Start: 2025-03-11 | End: 2025-03-11

## 2025-03-11 RX ORDER — CETIRIZINE HYDROCHLORIDE 10 MG/1
10 TABLET ORAL EVERY 12 HOURS
Qty: 30 TABLET | Refills: 0 | Status: SHIPPED | OUTPATIENT
Start: 2025-03-11 | End: 2025-03-26

## 2025-03-11 RX ORDER — LOSARTAN POTASSIUM 25 MG/1
25 TABLET ORAL DAILY
Qty: 30 TABLET | Refills: 11 | Status: SHIPPED | OUTPATIENT
Start: 2025-03-11 | End: 2025-03-21 | Stop reason: SDUPTHER

## 2025-03-11 RX ORDER — FAMOTIDINE 10 MG/ML
20 INJECTION, SOLUTION INTRAVENOUS ONCE
Status: COMPLETED | OUTPATIENT
Start: 2025-03-11 | End: 2025-03-11

## 2025-03-11 RX ADMIN — METHYLPREDNISOLONE SODIUM SUCCINATE 125 MG: 125 INJECTION, POWDER, FOR SOLUTION INTRAMUSCULAR; INTRAVENOUS at 02:41

## 2025-03-11 RX ADMIN — DIPHENHYDRAMINE HYDROCHLORIDE 50 MG: 50 INJECTION, SOLUTION INTRAMUSCULAR; INTRAVENOUS at 02:41

## 2025-03-11 RX ADMIN — TRANEXAMIC ACID 1000 MG: 10 INJECTION, SOLUTION INTRAVENOUS at 02:41

## 2025-03-11 RX ADMIN — FAMOTIDINE 20 MG: 10 INJECTION, SOLUTION INTRAVENOUS at 02:41

## 2025-03-11 RX ADMIN — EPINEPHRINE 0.5 MG: 1 INJECTION INTRAMUSCULAR; INTRAVENOUS; SUBCUTANEOUS at 02:42

## 2025-03-11 ASSESSMENT — PAIN - FUNCTIONAL ASSESSMENT: PAIN_FUNCTIONAL_ASSESSMENT: 0-10

## 2025-03-11 ASSESSMENT — PAIN SCALES - GENERAL: PAINLEVEL_OUTOF10: 0 - NO PAIN

## 2025-03-11 NOTE — ED PROVIDER NOTES
HPI   Chief Complaint   Patient presents with    Oral Swelling     PT had minor tongue swelling @ 2130 woke up at 0000 with worse. PT takes lisinopril.        49-year-old female with past ministry of hypertension presents ED with concerns for swelling to the face.  Patient says she had some swelling to her right jaw before going to bed but she woke up with her lips and tongue swollen.  Denies rash or itching.  No chest pain or shortness of breath.  She is on lisinopril.  Not have any pain.              Patient History   Past Medical History:   Diagnosis Date    Obesity, unspecified 04/12/2022    Class 2 obesity with body mass index (BMI) of 39.0 to 39.9 in adult    Pain in left leg 04/12/2022    Pain of left lower extremity    Pain in right arm 04/18/2021    Pain of right upper extremity    Personal history of other diseases of the musculoskeletal system and connective tissue 10/12/2021    History of tendinitis     Past Surgical History:   Procedure Laterality Date    OTHER SURGICAL HISTORY  11/18/2019    Tonsillectomy with adenoidectomy    OTHER SURGICAL HISTORY  11/18/2019    Cholecystectomy     Family History   Problem Relation Name Age of Onset    Lung cancer Father's Brother      Lung cancer Maternal Grandfather      Cancer Paternal Grandmother       Social History     Tobacco Use    Smoking status: Former     Types: Cigarettes    Smokeless tobacco: Former   Substance Use Topics    Alcohol use: Yes     Comment: rarely    Drug use: Not on file       Physical Exam   ED Triage Vitals [03/11/25 0219]   Temperature Heart Rate Respirations BP   36.1 °C (97 °F) (!) 120 18 153/85      Pulse Ox Temp src Heart Rate Source Patient Position   95 % -- -- --      BP Location FiO2 (%)     -- --       Physical Exam  Vitals and nursing note reviewed.   Constitutional:       General: She is not in acute distress.     Appearance: She is well-developed.   HENT:      Head: Normocephalic and atraumatic.      Comments: There is lip  and tongue swelling.  Normal voice.  Handling secretions.  No erythema or tenderness or signs of infection.  Eyes:      Conjunctiva/sclera: Conjunctivae normal.   Cardiovascular:      Rate and Rhythm: Normal rate and regular rhythm.      Heart sounds: No murmur heard.  Pulmonary:      Effort: Pulmonary effort is normal. No respiratory distress.      Breath sounds: Normal breath sounds.   Abdominal:      Palpations: Abdomen is soft.      Tenderness: There is no abdominal tenderness.   Musculoskeletal:         General: No swelling.      Cervical back: Neck supple.   Skin:     General: Skin is warm and dry.      Capillary Refill: Capillary refill takes less than 2 seconds.   Neurological:      Mental Status: She is alert.   Psychiatric:         Mood and Affect: Mood normal.           ED Course & MDM   Diagnoses as of 03/11/25 0639   Angioedema, initial encounter                 No data recorded     Yomaira Coma Scale Score: 15 (03/11/25 0220 : Segun Harrison RN)                           Medical Decision Making  HISTORIAN:  Patient    CHART REVIEW:  No pertinent findings    PT SUMMARY:  49-year-old female presents to ED with concerns for oral swelling.  Upon arrival she is tachycardic otherwise stable.    DDX:  Angioedema, anaphylaxis, deep space neck infection    PLAN:  Will treat patient's symptoms with epinephrine, Pepcid, Benadryl, steroids, TXA and FFP    DISPO/RE-EVAL:  On reevaluation patient's symptoms are much improved.  I did offer patient admission for further airway monitoring however she declined.  Therefore, will discharge patient home with prescriptions for steroids and antihistamines.  I also advised that she should discontinue lisinopril immediately.  I added this to her allergy list.  Recommend following with primary care.  Advised to come back to the ED for any new or worsening symptoms.          Procedure  Procedures     Oswald Méndez,   03/11/25 0643

## 2025-03-11 NOTE — TELEPHONE ENCOUNTER
Patient was seen in the ED she had an allergic reaction to her lisinopril. She will need to start a new medication.

## 2025-03-11 NOTE — PROGRESS NOTES
Patient called to report went to ED last night due to angioedema side effect from lisinopril. Was taken off the lisinopril in the ED but not restarted on another BP medication. Patients PCP out on leave, will order losartan for patient to start and have patient follow up with a [place] BP check in 1 weeks.

## 2025-03-11 NOTE — TELEPHONE ENCOUNTER
Step mom called because patient rode her bike to ER at 1:30am having an allergic reaction to her Lisinopril. She is worried about patient not having any BP medication. Patient was swollen and her throat was swollen shut. Can someone please advise?

## 2025-03-21 ENCOUNTER — TELEPHONE (OUTPATIENT)
Dept: PRIMARY CARE | Facility: CLINIC | Age: 50
End: 2025-03-21

## 2025-03-21 ENCOUNTER — APPOINTMENT (OUTPATIENT)
Dept: PRIMARY CARE | Facility: CLINIC | Age: 50
End: 2025-03-21
Payer: MEDICARE

## 2025-03-21 VITALS — SYSTOLIC BLOOD PRESSURE: 170 MMHG | HEART RATE: 82 BPM | DIASTOLIC BLOOD PRESSURE: 90 MMHG | OXYGEN SATURATION: 99 %

## 2025-03-21 DIAGNOSIS — I10 PRIMARY HYPERTENSION: Primary | ICD-10-CM

## 2025-03-21 DIAGNOSIS — I10 HYPERTENSION, UNSPECIFIED TYPE: Chronic | ICD-10-CM

## 2025-03-21 RX ORDER — LOSARTAN POTASSIUM 50 MG/1
50 TABLET ORAL DAILY
Qty: 30 TABLET | Refills: 1 | Status: SHIPPED | OUTPATIENT
Start: 2025-03-21 | End: 2025-05-20

## 2025-03-21 RX ORDER — HYDROCHLOROTHIAZIDE 12.5 MG/1
12.5 TABLET ORAL DAILY
Qty: 30 TABLET | Refills: 1 | Status: SHIPPED | OUTPATIENT
Start: 2025-03-21 | End: 2025-05-20

## 2025-03-21 NOTE — PROGRESS NOTES
Subjective   Patient ID: Karyna Stone is a 49 y.o. female who presents for Nurse Visit (Blood Pressure Recheck).    HPI     Review of Systems    Objective   /90 (BP Location: Right arm, Patient Position: Sitting)   Pulse 82   SpO2 99%     Physical Exam    Assessment/Plan

## 2025-03-21 NOTE — TELEPHONE ENCOUNTER
Please send script for losartan 50mg take 1 PO every day   Pharmacy [facility] in [place]    [phone] please call Tawnya

## 2025-04-21 ENCOUNTER — APPOINTMENT (OUTPATIENT)
Dept: PRIMARY CARE | Facility: CLINIC | Age: 50
End: 2025-04-21
Payer: MEDICARE

## 2025-04-21 VITALS
OXYGEN SATURATION: 99 % | HEART RATE: 88 BPM | WEIGHT: 231 LBS | DIASTOLIC BLOOD PRESSURE: 78 MMHG | BODY MASS INDEX: 43.61 KG/M2 | HEIGHT: 61 IN | SYSTOLIC BLOOD PRESSURE: 140 MMHG

## 2025-04-21 DIAGNOSIS — I10 PRIMARY HYPERTENSION: Primary | ICD-10-CM

## 2025-04-21 DIAGNOSIS — E78.2 MIXED HYPERLIPIDEMIA: ICD-10-CM

## 2025-04-21 DIAGNOSIS — R73.03 PREDIABETES: ICD-10-CM

## 2025-04-21 DIAGNOSIS — E66.01 OBESITY, MORBID (MULTI): ICD-10-CM

## 2025-04-21 DIAGNOSIS — I10 HYPERTENSION, UNSPECIFIED TYPE: Chronic | ICD-10-CM

## 2025-04-21 PROBLEM — F89 DEVELOPMENTAL DISABILITY: Status: ACTIVE | Noted: 2025-04-21

## 2025-04-21 PROBLEM — M54.12 CERVICAL RADICULOPATHY: Status: ACTIVE | Noted: 2025-04-21

## 2025-04-21 PROBLEM — R01.1 HEART MURMUR: Status: ACTIVE | Noted: 2025-04-21

## 2025-04-21 PROBLEM — E11.9 TYPE 2 DIABETES MELLITUS WITHOUT COMPLICATION: Status: ACTIVE | Noted: 2025-04-21

## 2025-04-21 PROCEDURE — 3008F BODY MASS INDEX DOCD: CPT

## 2025-04-21 PROCEDURE — 3077F SYST BP >= 140 MM HG: CPT

## 2025-04-21 PROCEDURE — 4010F ACE/ARB THERAPY RXD/TAKEN: CPT

## 2025-04-21 PROCEDURE — 3078F DIAST BP <80 MM HG: CPT

## 2025-04-21 PROCEDURE — 1036F TOBACCO NON-USER: CPT

## 2025-04-21 PROCEDURE — 99214 OFFICE O/P EST MOD 30 MIN: CPT

## 2025-04-21 RX ORDER — LOSARTAN POTASSIUM 50 MG/1
50 TABLET ORAL DAILY
Qty: 90 TABLET | Refills: 1 | Status: SHIPPED | OUTPATIENT
Start: 2025-04-21

## 2025-04-21 RX ORDER — HYDROCHLOROTHIAZIDE 12.5 MG/1
12.5 TABLET ORAL DAILY
Qty: 90 TABLET | Refills: 1 | Status: SHIPPED | OUTPATIENT
Start: 2025-04-21

## 2025-04-21 ASSESSMENT — ENCOUNTER SYMPTOMS
OCCASIONAL FEELINGS OF UNSTEADINESS: 0
LOSS OF SENSATION IN FEET: 0
CONSTIPATION: 0
SHORTNESS OF BREATH: 0
DIARRHEA: 0
COUGH: 0
DEPRESSION: 0

## 2025-04-21 ASSESSMENT — COLUMBIA-SUICIDE SEVERITY RATING SCALE - C-SSRS
6. HAVE YOU EVER DONE ANYTHING, STARTED TO DO ANYTHING, OR PREPARED TO DO ANYTHING TO END YOUR LIFE?: NO
1. IN THE PAST MONTH, HAVE YOU WISHED YOU WERE DEAD OR WISHED YOU COULD GO TO SLEEP AND NOT WAKE UP?: NO
2. HAVE YOU ACTUALLY HAD ANY THOUGHTS OF KILLING YOURSELF?: NO

## 2025-04-21 NOTE — ASSESSMENT & PLAN NOTE
Lab Results   Component Value Date    HGBA1C 5.8 (A) 04/09/2022    HGBA1C 5.8 04/09/2021    HGBA1C 5.8 10/30/2020     - Recheck A1c prior to next visit, ordered

## 2025-04-21 NOTE — ASSESSMENT & PLAN NOTE
- Continue to do Simin, discussed exercising at least 30 mins daily.   -Encouraged Mediterranean diet and advised patient to consume low carbs/low fat diet

## 2025-04-21 NOTE — ASSESSMENT & PLAN NOTE
-Continue Losartan 50 mg and 12.5 hydrochlorothiazide for now, check BP at home and call the office with readings. Will consider increasing meds once patient calls with home BP readings in a couple of weeks.  -Discussed Mediterranean diet/Heart healthy diet and exercise, work on reducing weight  -Educated patient regarding risks of uncontrolled HTN and that her goal BP is less than 130/80.    Orders:    hydroCHLOROthiazide (Microzide) 12.5 mg tablet; Take 1 tablet (12.5 mg) by mouth once daily.    losartan (Cozaar) 50 mg tablet; Take 1 tablet (50 mg) by mouth once daily.

## 2025-04-21 NOTE — PATIENT INSTRUCTIONS
Check BP at home and keep a log please so you can bring that with you to your next appointment. Call office with BP readings in a couple of weeks (check 2-3 times a week at home and write down the numbers)    Watch your salt intake, consume heart healthy diet. I have attached Mediterranean diet information here please read.     Get Labs done, fasting before your next appointment.

## 2025-04-21 NOTE — ASSESSMENT & PLAN NOTE
- continue Lipitor 10 mg  -Modify diet and lifestyle  -Will recheck Lipid panel before next visits and modify med accordingly

## 2025-04-21 NOTE — PROGRESS NOTES
"Subjective   Patient ID: Karyna Stone is a 49 y.o. female who presents for Follow-up (Pt here for follow up).    HPI     50 yo female presents for BP follow-up after recent ER visit in March. Was on Lisinopril and ended up in the ER last months with face, neck, and tongue swelling. Had been taking Lisinopril for many years but her PCP increased the dose at last visit and she thinks that may have caused her to have that reaction. Got switched to Losartan 50 mg and is tolerating that without side effects. Also takes hydrochlorothiazide which was recently started and is tolerating that as well. Reports after starting the hydrochlorothiazide and Losartan her BP at home was 124/70.     Does [employer] Mondays and Wednesdays. Works 5 days a week at [employer]. States work gets stressful at times.    Review of Systems   Respiratory:  Negative for cough and shortness of breath.    Cardiovascular:  Negative for chest pain.   Gastrointestinal:  Negative for constipation and diarrhea.   All other systems reviewed and are negative.      Objective   /78   Pulse 88   Ht 1.549 m (5' 1\")   Wt 105 kg (231 lb)   SpO2 99%   BMI 43.65 kg/m²     Physical Exam  Constitutional:       Appearance: She is obese.   HENT:      Head: Normocephalic.   Cardiovascular:      Rate and Rhythm: Normal rate and regular rhythm.      Pulses: Normal pulses.   Pulmonary:      Effort: Pulmonary effort is normal.      Breath sounds: Normal breath sounds.   Abdominal:      General: Bowel sounds are normal.      Palpations: Abdomen is soft.   Skin:     General: Skin is warm and dry.      Comments: flushed   Neurological:      Mental Status: She is alert and oriented to person, place, and time.   Psychiatric:         Mood and Affect: Mood normal.         Behavior: Behavior normal.         Assessment & Plan  Primary hypertension  -Continue Losartan 50 mg and 12.5 hydrochlorothiazide for now, check BP at home and call the office with readings. Will " consider increasing meds once patient calls with home BP readings in a couple of weeks.  -Discussed Mediterranean diet/Heart healthy diet and exercise, work on reducing weight  -Educated patient regarding risks of uncontrolled HTN and that her goal BP is less than 130/80.    Orders:    hydroCHLOROthiazide (Microzide) 12.5 mg tablet; Take 1 tablet (12.5 mg) by mouth once daily.    losartan (Cozaar) 50 mg tablet; Take 1 tablet (50 mg) by mouth once daily.    Prediabetes  Lab Results   Component Value Date    HGBA1C 5.8 (A) 04/09/2022    HGBA1C 5.8 04/09/2021    HGBA1C 5.8 10/30/2020     - Recheck A1c prior to next visit, ordered       Obesity, morbid (Multi)  - Continue to do Simin, discussed exercising at least 30 mins daily.   -Encouraged Mediterranean diet and advised patient to consume low carbs/low fat diet       Mixed hyperlipidemia  - continue Lipitor 10 mg  -Modify diet and lifestyle  -Will recheck Lipid panel before next visits and modify med accordingly          Follow-up with me in July as scheduled. Bring a BP log.

## 2025-05-07 ENCOUNTER — TELEPHONE (OUTPATIENT)
Dept: PRIMARY CARE | Facility: CLINIC | Age: 50
End: 2025-05-07
Payer: MEDICARE

## 2025-05-07 DIAGNOSIS — I10 PRIMARY HYPERTENSION: ICD-10-CM

## 2025-05-07 RX ORDER — HYDROCHLOROTHIAZIDE 12.5 MG/1
25 TABLET ORAL DAILY
Start: 2025-05-07

## 2025-05-07 NOTE — TELEPHONE ENCOUNTER
Called patient to double check times of day  said is alternating as advised  140's / 80's but not much different  when checked no other symptoms  Mom  stated she would call her and have  call in all the readings

## 2025-05-07 NOTE — TELEPHONE ENCOUNTER
She called in to let you know that she has been check her BP for the last 2 weeks and it has been 140/80 everyday I asked her it changed al she stated no. I asked if she feeling anything she stated no just that you want it down in the 30's. Please call her

## 2025-05-07 NOTE — TELEPHONE ENCOUNTER
Please tell the patient to take two of the hydrochlorothiazide pills a day making it a 25 mg dose and continue taking Losartan 50 mg tablet daily. She has follow up with me in July.

## 2025-05-07 NOTE — TELEPHONE ENCOUNTER
4/22/25 405 4am 142/79, 4pm 147/80  4/24/25 142 /83 @ 4am 141/82 at 9pm, 4/16/25 145/86 at 4am,   at 8pm 149/85,   4/29/25 4am 142/82,  144/80 4pm,   5/2 145/80 at 4am,  140/88 at 9pm

## 2025-06-11 ENCOUNTER — TELEPHONE (OUTPATIENT)
Dept: PRIMARY CARE | Facility: CLINIC | Age: 50
End: 2025-06-11
Payer: MEDICARE

## 2025-06-11 DIAGNOSIS — E11.9 TYPE 2 DIABETES MELLITUS WITHOUT COMPLICATION, WITHOUT LONG-TERM CURRENT USE OF INSULIN: ICD-10-CM

## 2025-06-11 DIAGNOSIS — E78.2 MIXED HYPERLIPIDEMIA: ICD-10-CM

## 2025-06-11 DIAGNOSIS — E11.9 NEWLY DIAGNOSED DIABETES: Primary | ICD-10-CM

## 2025-06-11 LAB
25(OH)D3+25(OH)D2 SERPL-MCNC: 29 NG/ML (ref 30–100)
ALBUMIN SERPL-MCNC: 4.4 G/DL (ref 3.6–5.1)
ALP SERPL-CCNC: 66 U/L (ref 31–125)
ALT SERPL-CCNC: 23 U/L (ref 6–29)
ANION GAP SERPL CALCULATED.4IONS-SCNC: 14 MMOL/L (CALC) (ref 7–17)
BASOPHILS # BLD AUTO: 48 CELLS/UL (ref 0–200)
BASOPHILS NFR BLD AUTO: 0.5 %
BILIRUB SERPL-MCNC: 0.9 MG/DL (ref 0.2–1.2)
BUN SERPL-MCNC: 11 MG/DL (ref 7–25)
CALCIUM SERPL-MCNC: 9.4 MG/DL (ref 8.6–10.2)
CHLORIDE SERPL-SCNC: 97 MMOL/L (ref 98–110)
CHOLEST SERPL-MCNC: 227 MG/DL
CHOLEST/HDLC SERPL: 2.6 (CALC)
CO2 SERPL-SCNC: 22 MMOL/L (ref 20–32)
CREAT SERPL-MCNC: 0.56 MG/DL (ref 0.5–0.99)
EGFRCR SERPLBLD CKD-EPI 2021: 113 ML/MIN/1.73M2
EOSINOPHIL # BLD AUTO: 133 CELLS/UL (ref 15–500)
EOSINOPHIL NFR BLD AUTO: 1.4 %
ERYTHROCYTE [DISTWIDTH] IN BLOOD BY AUTOMATED COUNT: 12.3 % (ref 11–15)
EST. AVERAGE GLUCOSE BLD GHB EST-MCNC: 148 MG/DL
EST. AVERAGE GLUCOSE BLD GHB EST-SCNC: 8.2 MMOL/L
GLUCOSE SERPL-MCNC: 105 MG/DL (ref 65–99)
HBA1C MFR BLD: 6.8 %
HCT VFR BLD AUTO: 44.8 % (ref 35–45)
HDLC SERPL-MCNC: 87 MG/DL
HGB BLD-MCNC: 14.4 G/DL (ref 11.7–15.5)
LDLC SERPL CALC-MCNC: 115 MG/DL (CALC)
LYMPHOCYTES # BLD AUTO: 3268 CELLS/UL (ref 850–3900)
LYMPHOCYTES NFR BLD AUTO: 34.4 %
MCH RBC QN AUTO: 28.5 PG (ref 27–33)
MCHC RBC AUTO-ENTMCNC: 32.1 G/DL (ref 32–36)
MCV RBC AUTO: 88.7 FL (ref 80–100)
MONOCYTES # BLD AUTO: 542 CELLS/UL (ref 200–950)
MONOCYTES NFR BLD AUTO: 5.7 %
NEUTROPHILS # BLD AUTO: 5510 CELLS/UL (ref 1500–7800)
NEUTROPHILS NFR BLD AUTO: 58 %
NONHDLC SERPL-MCNC: 140 MG/DL (CALC)
PLATELET # BLD AUTO: 344 THOUSAND/UL (ref 140–400)
PMV BLD REES-ECKER: 11 FL (ref 7.5–12.5)
POTASSIUM SERPL-SCNC: ABNORMAL MMOL/L
PROT SERPL-MCNC: 6.9 G/DL (ref 6.1–8.1)
RBC # BLD AUTO: 5.05 MILLION/UL (ref 3.8–5.1)
SODIUM SERPL-SCNC: 133 MMOL/L (ref 135–146)
TRIGL SERPL-MCNC: 135 MG/DL
WBC # BLD AUTO: 9.5 THOUSAND/UL (ref 3.8–10.8)

## 2025-06-11 RX ORDER — METFORMIN HYDROCHLORIDE 500 MG/1
500 TABLET, EXTENDED RELEASE ORAL
Qty: 180 TABLET | Refills: 1 | Status: SHIPPED | OUTPATIENT
Start: 2025-06-11

## 2025-06-11 NOTE — RESULT ENCOUNTER NOTE
Total cholesterol and LDL (bad cholesterol) are elevated. Sodium level is mildly low likely from the BP medications you are on, not too concerning will continue to monitor with labs. Hemoglobin A1c went from 5.8 to 6.8 indicating diabetes now. I am going to send in Metformin to take twice a day to help manage your diabetes. Recommend lifestyle and dietary changes (avoid food high in saturated and trans fat & high carb, avoid sugary drinks and desserts). Incorporate daily exercise.

## 2025-06-11 NOTE — TELEPHONE ENCOUNTER
----- Message from Nimesh Zhou sent at 6/11/2025  1:17 PM EDT -----  Total cholesterol and LDL (bad cholesterol) are elevated. Sodium level is mildly low likely from the BP medications you are on, not too concerning will continue to monitor with labs. Hemoglobin A1c   went from 5.8 to 6.8 indicating diabetes now. I am going to send in Metformin to take twice a day to help manage your diabetes. Recommend lifestyle and dietary changes (avoid food high in saturated   and trans fat & high carb, avoid sugary drinks and desserts). Incorporate daily exercise.     ----- Message -----  From: Geovanni Qustreet Results In  Sent: 6/11/2025   6:56 AM EDT  To: Khushbu Contreras, DO

## 2025-07-07 ENCOUNTER — APPOINTMENT (OUTPATIENT)
Dept: PRIMARY CARE | Facility: CLINIC | Age: 50
End: 2025-07-07
Payer: MEDICARE

## 2025-07-07 VITALS
HEART RATE: 99 BPM | DIASTOLIC BLOOD PRESSURE: 93 MMHG | BODY MASS INDEX: 43.43 KG/M2 | WEIGHT: 230 LBS | HEIGHT: 61 IN | SYSTOLIC BLOOD PRESSURE: 151 MMHG | OXYGEN SATURATION: 99 %

## 2025-07-07 DIAGNOSIS — Z00.00 PREVENTATIVE HEALTH CARE: ICD-10-CM

## 2025-07-07 DIAGNOSIS — T78.40XA ALLERGY, INITIAL ENCOUNTER: ICD-10-CM

## 2025-07-07 DIAGNOSIS — M77.9 TENDONITIS: ICD-10-CM

## 2025-07-07 DIAGNOSIS — Z12.31 SCREENING MAMMOGRAM FOR BREAST CANCER: ICD-10-CM

## 2025-07-07 DIAGNOSIS — K21.9 GASTROESOPHAGEAL REFLUX DISEASE WITHOUT ESOPHAGITIS: ICD-10-CM

## 2025-07-07 DIAGNOSIS — E78.2 MIXED HYPERLIPIDEMIA: ICD-10-CM

## 2025-07-07 DIAGNOSIS — E11.9 TYPE 2 DIABETES MELLITUS WITHOUT COMPLICATION, WITHOUT LONG-TERM CURRENT USE OF INSULIN: ICD-10-CM

## 2025-07-07 DIAGNOSIS — R39.15 URINARY URGENCY: ICD-10-CM

## 2025-07-07 DIAGNOSIS — I10 PRIMARY HYPERTENSION: Primary | ICD-10-CM

## 2025-07-07 DIAGNOSIS — Z30.09 ENCOUNTER FOR COUNSELING REGARDING CONTRACEPTION: ICD-10-CM

## 2025-07-07 PROCEDURE — 1036F TOBACCO NON-USER: CPT

## 2025-07-07 PROCEDURE — 3077F SYST BP >= 140 MM HG: CPT

## 2025-07-07 PROCEDURE — 99214 OFFICE O/P EST MOD 30 MIN: CPT

## 2025-07-07 PROCEDURE — 3008F BODY MASS INDEX DOCD: CPT

## 2025-07-07 PROCEDURE — 3080F DIAST BP >= 90 MM HG: CPT

## 2025-07-07 RX ORDER — MONTELUKAST SODIUM 10 MG/1
10 TABLET ORAL DAILY
Qty: 90 TABLET | Refills: 3 | Status: SHIPPED | OUTPATIENT
Start: 2025-07-07

## 2025-07-07 RX ORDER — DROSPIRENONE AND ETHINYL ESTRADIOL 0.03MG-3MG
1 KIT ORAL DAILY
Qty: 84 TABLET | Refills: 3 | Status: SHIPPED | OUTPATIENT
Start: 2025-07-07

## 2025-07-07 RX ORDER — LISINOPRIL AND HYDROCHLOROTHIAZIDE 12.5; 2 MG/1; MG/1
TABLET ORAL
COMMUNITY
Start: 2025-06-29 | End: 2025-07-07 | Stop reason: SINTOL

## 2025-07-07 RX ORDER — ATORVASTATIN CALCIUM 20 MG/1
20 TABLET, FILM COATED ORAL NIGHTLY
Qty: 90 TABLET | Refills: 3 | Status: SHIPPED | OUTPATIENT
Start: 2025-07-07

## 2025-07-07 RX ORDER — OMEPRAZOLE 20 MG/1
CAPSULE, DELAYED RELEASE ORAL
Qty: 90 CAPSULE | Refills: 3 | Status: SHIPPED | OUTPATIENT
Start: 2025-07-07

## 2025-07-07 RX ORDER — MELOXICAM 15 MG/1
15 TABLET ORAL DAILY PRN
Qty: 90 TABLET | Refills: 0 | Status: SHIPPED | OUTPATIENT
Start: 2025-07-07

## 2025-07-07 RX ORDER — LOSARTAN POTASSIUM AND HYDROCHLOROTHIAZIDE 25; 100 MG/1; MG/1
1 TABLET ORAL DAILY
Qty: 90 TABLET | Refills: 3 | Status: SHIPPED | OUTPATIENT
Start: 2025-07-07 | End: 2026-07-07

## 2025-07-07 RX ORDER — TOLTERODINE 4 MG/1
CAPSULE, EXTENDED RELEASE ORAL
Qty: 90 CAPSULE | Refills: 3 | Status: SHIPPED | OUTPATIENT
Start: 2025-07-07

## 2025-07-07 RX ORDER — METFORMIN HYDROCHLORIDE 500 MG/1
500 TABLET, EXTENDED RELEASE ORAL
Qty: 180 TABLET | Refills: 3 | Status: SHIPPED | OUTPATIENT
Start: 2025-07-07

## 2025-07-07 RX ORDER — LOSARTAN POTASSIUM 50 MG/1
50 TABLET ORAL DAILY
Qty: 90 TABLET | Refills: 3 | Status: CANCELLED | OUTPATIENT
Start: 2025-07-07

## 2025-07-07 ASSESSMENT — ENCOUNTER SYMPTOMS
OCCASIONAL FEELINGS OF UNSTEADINESS: 0
DEPRESSION: 0
POLYDIPSIA: 1
BLOOD IN STOOL: 0
LOSS OF SENSATION IN FEET: 0
DYSURIA: 0
DIFFICULTY URINATING: 0
CONSTIPATION: 0
DIARRHEA: 0

## 2025-07-07 ASSESSMENT — PATIENT HEALTH QUESTIONNAIRE - PHQ9
1. LITTLE INTEREST OR PLEASURE IN DOING THINGS: NOT AT ALL
SUM OF ALL RESPONSES TO PHQ9 QUESTIONS 1 AND 2: 0
2. FEELING DOWN, DEPRESSED OR HOPELESS: NOT AT ALL

## 2025-07-07 NOTE — ASSESSMENT & PLAN NOTE
"-Cholesterol 227,  despite being on 10 mg Lipitor  -Increased Lipitor to 20 mg, patient agreeable   --Limit foods high in saturated fat. Saturated fats come from animal products (such as cheese, fatty meats, and dairy desserts) and tropical oils (such as palm oil). Foods that are higher in saturated fat may be high in cholesterol.   -Choose foods that are low in saturated fat, trans fat, and added sugars. These foods include lean meats; seafood; fat-free or low-fat milk, cheese, and yogurt; whole grains; and fruits and vegetables.   -Eat foods naturally high in fiber, such as oatmeal and beans (black, lindsay, kidney, lima, and others), and unsaturated fats, which can be found in avocados, vegetable oils like olive oil, and nuts. These foods may help prevent and manage high levels of low-density lipoprotein (LDL, or \"bad\") cholesterol and triglycerides while increasing high-density lipoprotein (HDL, or \"good\") cholesterol levels.       Orders:    atorvastatin (Lipitor) 20 mg tablet; Take 1 tablet (20 mg) by mouth once daily at bedtime.    Follow Up In [facility] - PCP - Established; Future    "

## 2025-07-07 NOTE — PROGRESS NOTES
"Subjective   Patient ID: Karyna Stone is a 49 y.o. female who presents for Follow-up (Pt here for follow up).    HPI   50 yo female presents for a follow up. I increased hydrochlorothiazide to 25 mg on 5/7 by message (12.5 mg two tabs a day) due to her having high BP readings at home. Also on Losartan 50 mg. Has not had any issues with Losartan and hydrochlorothiazide. Wants to see if she could be on the combination pill instead of taking two separate pills.     Labs done in June reviewed: cholesterol 227, , A1c 6.8 (initiated Metformin  mg twice a day)    Has been nervous due to her lab results.    Review of Systems   Gastrointestinal:  Negative for blood in stool, constipation and diarrhea.   Endocrine: Positive for polydipsia and polyuria.   Genitourinary:  Negative for difficulty urinating and dysuria.   All other systems reviewed and are negative.      Objective   BP (!) 151/93 (BP Location: Right arm, Patient Position: Sitting)   Pulse 99   Ht (!) 1.549 m (5' 1\")   Wt 104 kg (230 lb)   SpO2 99%   BMI 43.46 kg/m²     Physical Exam  Constitutional:       Appearance: She is obese.   HENT:      Head: Normocephalic.   Cardiovascular:      Rate and Rhythm: Normal rate and regular rhythm.   Pulmonary:      Effort: Pulmonary effort is normal.      Breath sounds: Normal breath sounds.   Abdominal:      General: Bowel sounds are normal.      Palpations: Abdomen is soft.   Musculoskeletal:      Right lower leg: No edema.      Left lower leg: No edema.   Skin:     General: Skin is warm and dry.   Neurological:      Mental Status: She is alert and oriented to person, place, and time.   Psychiatric:         Mood and Affect: Mood normal.         Behavior: Behavior normal.         Assessment & Plan  Primary hypertension  -BP remains elevated, patient does not check it at home  -Will started Hyzaar 100-25 mg since patient wants a combination medication  -Monitor sodium intake, avoid chronic NSAID " "use  -Work on weight loss  -Exercise daily  -Check BP at home few times a week, call with readings in 1-2 weeks  -Goal BP <130/80  Orders:    Follow [facility] - PCP - Established    losartan-hydrochlorothiazide (Hyzaar) 100-25 mg tablet; Take 1 tablet by mouth once daily.    Follow Up [facility] - PCP - Established; Future    Mixed hyperlipidemia  -Cholesterol 227,  despite being on 10 mg Lipitor  -Increased Lipitor to 20 mg, patient agreeable   --Limit foods high in saturated fat. Saturated fats come from animal products (such as cheese, fatty meats, and dairy desserts) and tropical oils (such as palm oil). Foods that are higher in saturated fat may be high in cholesterol.   -Choose foods that are low in saturated fat, trans fat, and added sugars. These foods include lean meats; seafood; fat-free or low-fat milk, cheese, and yogurt; whole grains; and fruits and vegetables.   -Eat foods naturally high in fiber, such as oatmeal and beans (black, lindsay, kidney, lima, and others), and unsaturated fats, which can be found in avocados, vegetable oils like olive oil, and nuts. These foods may help prevent and manage high levels of low-density lipoprotein (LDL, or \"bad\") cholesterol and triglycerides while increasing high-density lipoprotein (HDL, or \"good\") cholesterol levels.       Orders:    atorvastatin (Lipitor) 20 mg tablet; Take 1 tablet (20 mg) by mouth once daily at bedtime.    Follow Up [facility] - PCP - Established; Future    Encounter for counseling regarding contraception    Orders:    drospirenone-ethinyl estradioL (Erika, Ocella) 3-0.03 mg tablet; Take 1 tablet by mouth once daily. as directed    Tendonitis  -Advise to use only as needed, avoid chronic daily use of Mobic/NSAID, take Tylenol instead  Orders:    meloxicam (Mobic) 15 mg tablet; Take 1 tablet (15 mg) by mouth once daily as needed for moderate pain (4 - 6).    Type 2 diabetes mellitus without " complication, without long-term current use of insulin  Lab Results   Component Value Date    HGBA1C 6.8 (H) 06/10/2025    HGBA1C 5.8 (A) 04/09/2022    HGBA1C 5.8 04/09/2021     -Newly diagnosed diabetes, doing fine with Metformin  mg twice a day   -Discussed diet and exercise    Orders:    metFORMIN XR (Glucophage-XR) 500 mg 24 hr tablet; Take 1 tablet (500 mg) by mouth 2 times daily (morning and late afternoon). Do not crush, chew, or split.    Follow Up [facility] - PCP - Established; Future    Allergy, initial encounter  -stable on Singulair  Orders:    montelukast (Singulair) 10 mg tablet; Take 1 tablet (10 mg) by mouth once daily.    Urinary urgency  -Stable on Detrol, continue  Orders:    tolterodine LA (Detrol LA) 4 mg 24 hr capsule; TAKE 1 CAPSULE BY MOUTH ONCE DAILY. DO NOT CRUSH,CHEW, OR SPIT.    Gastroesophageal reflux disease without esophagitis  -stable on Prilosec, continue  Orders:    omeprazole (PriLOSEC) 20 mg DR capsule; TAKE 1 CAPSULE BY MOUTH EVERY DAY BEFORE BREAKFAST    Screening mammogram for breast cancer    Orders:    BI mammo bilateral screening tomosynthesis; Future    Preventative health care  Refer to GYN for women's health preventive exam  Orders:    Referral to Gynecology; Future

## 2025-07-07 NOTE — ASSESSMENT & PLAN NOTE
Lab Results   Component Value Date    HGBA1C 6.8 (H) 06/10/2025    HGBA1C 5.8 (A) 04/09/2022    HGBA1C 5.8 04/09/2021     -Newly diagnosed diabetes, doing fine with Metformin  mg twice a day   -Discussed diet and exercise    Orders:    metFORMIN XR (Glucophage-XR) 500 mg 24 hr tablet; Take 1 tablet (500 mg) by mouth 2 times daily (morning and late afternoon). Do not crush, chew, or split.    Follow Up [facility] - PCP - Established; Future

## 2025-07-07 NOTE — PATIENT INSTRUCTIONS
"-Limit foods high in saturated fat. Saturated fats come from animal products (such as cheese, fatty meats, and dairy desserts) and tropical oils (such as palm oil). Foods that are higher in saturated fat may be high in cholesterol.   -Choose foods that are low in saturated fat, trans fat, and added sugars. These foods include lean meats; seafood; fat-free or low-fat milk, cheese, and yogurt; whole grains; and fruits and vegetables.   -Eat foods naturally high in fiber, such as oatmeal and beans (black, lindsay, kidney, lima, and others), and unsaturated fats, which can be found in avocados, vegetable oils like olive oil, and nuts. These foods may help prevent and manage high levels of low-density lipoprotein (LDL, or \"bad\") cholesterol and triglycerides while increasing high-density lipoprotein (HDL, or \"good\") cholesterol levels.    "

## 2025-07-07 NOTE — ASSESSMENT & PLAN NOTE
-stable on Singulair  Orders:    montelukast (Singulair) 10 mg tablet; Take 1 tablet (10 mg) by mouth once daily.

## 2025-07-07 NOTE — ASSESSMENT & PLAN NOTE
-Advise to use only as needed, avoid chronic daily use of Mobic/NSAID, take Tylenol instead  Orders:    meloxicam (Mobic) 15 mg tablet; Take 1 tablet (15 mg) by mouth once daily as needed for moderate pain (4 - 6).

## 2025-07-07 NOTE — ASSESSMENT & PLAN NOTE
-stable on Prilosec, continue  Orders:    omeprazole (PriLOSEC) 20 mg DR capsule; TAKE 1 CAPSULE BY MOUTH EVERY DAY BEFORE BREAKFAST

## 2025-07-07 NOTE — ASSESSMENT & PLAN NOTE
-BP remains elevated, patient does not check it at home  -Will started Hyzaar 100-25 mg since patient wants a combination medication  -Monitor sodium intake, avoid chronic NSAID use  -Work on weight loss  -Exercise daily  -Check BP at home few times a week, call with readings in 1-2 weeks  -Goal BP <130/80  Orders:    Follow Up [facility] - PCP - Established    losartan-hydrochlorothiazide (Hyzaar) 100-25 mg tablet; Take 1 tablet by mouth once daily.    Follow Up [facility] - PCP - Established; Future

## 2025-07-21 ENCOUNTER — TELEPHONE (OUTPATIENT)
Dept: PRIMARY CARE | Facility: CLINIC | Age: 50
End: 2025-07-21
Payer: MEDICARE

## 2025-07-21 ENCOUNTER — HOSPITAL ENCOUNTER (OUTPATIENT)
Dept: RADIOLOGY | Facility: HOSPITAL | Age: 50
Discharge: HOME | End: 2025-07-21
Payer: MEDICARE

## 2025-07-21 VITALS — BODY MASS INDEX: 43.43 KG/M2 | WEIGHT: 230 LBS | HEIGHT: 61 IN

## 2025-07-21 DIAGNOSIS — Z12.31 SCREENING MAMMOGRAM FOR BREAST CANCER: ICD-10-CM

## 2025-07-21 PROCEDURE — 77067 SCR MAMMO BI INCL CAD: CPT

## 2025-07-21 PROCEDURE — 77063 BREAST TOMOSYNTHESIS BI: CPT

## 2025-08-06 ENCOUNTER — TELEPHONE (OUTPATIENT)
Dept: PRIMARY CARE | Facility: CLINIC | Age: 50
End: 2025-08-06
Payer: MEDICARE

## 2025-08-06 DIAGNOSIS — I10 PRIMARY HYPERTENSION: Primary | ICD-10-CM

## 2025-08-06 RX ORDER — AMLODIPINE BESYLATE 5 MG/1
5 TABLET ORAL DAILY
Qty: 30 TABLET | Refills: 0 | Status: SHIPPED | OUTPATIENT
Start: 2025-08-06 | End: 2026-02-02

## 2025-08-06 NOTE — TELEPHONE ENCOUNTER
Those are still above the range we would want her at. Is she having any snoring episodes? Sometimes sleep apnea contributes to hard to control blood pressures. I am sending in a low dose of amlodipine for her to take in addition to her current medication.

## 2025-08-06 NOTE — TELEPHONE ENCOUNTER
She called in to give her BP readings. 7/5/2025 142/80, 7/17/2025 142/80, 7/19/2025 141/80, 7/22/2025 144/80, 7/25/2025 141/77, 7/29/2025 143/80 please advise and her at [phone] she is there til 5 pm

## 2025-08-29 DIAGNOSIS — I10 PRIMARY HYPERTENSION: ICD-10-CM

## 2025-08-29 RX ORDER — AMLODIPINE BESYLATE 5 MG/1
5 TABLET ORAL DAILY
Qty: 90 TABLET | Refills: 3 | Status: SHIPPED | OUTPATIENT
Start: 2025-08-29

## 2025-10-08 ENCOUNTER — APPOINTMENT (OUTPATIENT)
Dept: PRIMARY CARE | Facility: CLINIC | Age: 50
End: 2025-10-08
Payer: MEDICARE

## 2025-10-20 ENCOUNTER — APPOINTMENT (OUTPATIENT)
Dept: OBSTETRICS AND GYNECOLOGY | Facility: CLINIC | Age: 50
End: 2025-10-20
Payer: MEDICARE